# Patient Record
Sex: FEMALE | Race: WHITE | NOT HISPANIC OR LATINO | Employment: PART TIME | URBAN - METROPOLITAN AREA
[De-identification: names, ages, dates, MRNs, and addresses within clinical notes are randomized per-mention and may not be internally consistent; named-entity substitution may affect disease eponyms.]

---

## 2019-07-01 ENCOUNTER — HOSPITAL ENCOUNTER (OUTPATIENT)
Dept: GENERAL RADIOLOGY | Facility: HOSPITAL | Age: 64
Discharge: HOME | End: 2019-07-01
Attending: PHYSICIAN ASSISTANT

## 2019-07-08 ENCOUNTER — HOSPITAL ENCOUNTER (OUTPATIENT)
Dept: LAB | Facility: HOSPITAL | Age: 64
Discharge: HOME | End: 2019-07-08
Attending: PHYSICIAN ASSISTANT

## 2019-07-08 LAB
25(OH)D3 SERPL-MCNC: 19.3 NG/ML (ref 30–100)
ALBUMIN SERPL-MCNC: 4.3 G/DL (ref 3.5–5)
ALBUMIN/GLOB SERPL: 1.5 {RATIO} (ref 1.4–2.6)
ALP SERPL-CCNC: 102 U/L (ref 43–160)
ALT SERPL-CCNC: 24 U/L (ref 10–40)
ANION GAP SERPL CALC-SCNC: 20 MMOL/L (ref 8–19)
AST SERPL-CCNC: 21 U/L (ref 15–50)
BASOPHILS # BLD AUTO: 0.04 10*3/UL (ref 0–0.2)
BASOPHILS NFR BLD AUTO: 0.7 % (ref 0–3)
BILIRUB SERPL-MCNC: 0.27 MG/DL (ref 0.2–1.3)
BUN SERPL-MCNC: 15 MG/DL (ref 5–25)
BUN/CREAT SERPL: 15 {RATIO} (ref 6–20)
CALCIUM SERPL-MCNC: 9.4 MG/DL (ref 8.7–10.4)
CHLORIDE SERPL-SCNC: 104 MMOL/L (ref 99–111)
CHOLEST SERPL-MCNC: 246 MG/DL (ref 107–200)
CHOLEST/HDLC SERPL: 5.7 {RATIO} (ref 3–6)
CONV ABS IMM GRAN: 0.02 10*3/UL (ref 0–0.2)
CONV CO2: 23 MMOL/L (ref 22–32)
CONV IMMATURE GRAN: 0.4 % (ref 0–1.8)
CONV TOTAL PROTEIN: 7.2 G/DL (ref 6.3–8.2)
CREAT UR-MCNC: 0.99 MG/DL (ref 0.5–0.9)
DEPRECATED RDW RBC AUTO: 49.5 FL (ref 36.4–46.3)
EOSINOPHIL # BLD AUTO: 0.27 10*3/UL (ref 0–0.7)
EOSINOPHIL # BLD AUTO: 4.8 % (ref 0–7)
ERYTHROCYTE [DISTWIDTH] IN BLOOD BY AUTOMATED COUNT: 13.7 % (ref 11.7–14.4)
GFR SERPLBLD BASED ON 1.73 SQ M-ARVRAT: 60 ML/MIN/{1.73_M2}
GLOBULIN UR ELPH-MCNC: 2.9 G/DL (ref 2–3.5)
GLUCOSE SERPL-MCNC: 98 MG/DL (ref 65–99)
HBA1C MFR BLD: 12.7 G/DL (ref 12–16)
HCT VFR BLD AUTO: 38.3 % (ref 37–47)
HDLC SERPL-MCNC: 43 MG/DL (ref 40–60)
LDLC SERPL CALC-MCNC: 169 MG/DL (ref 70–100)
LYMPHOCYTES # BLD AUTO: 2.49 10*3/UL (ref 1–5)
MCH RBC QN AUTO: 32.4 PG (ref 27–31)
MCHC RBC AUTO-ENTMCNC: 33.2 G/DL (ref 33–37)
MCV RBC AUTO: 97.7 FL (ref 81–99)
MONOCYTES # BLD AUTO: 0.39 10*3/UL (ref 0.2–1.2)
MONOCYTES NFR BLD AUTO: 7 % (ref 3–10)
NEUTROPHILS # BLD AUTO: 2.38 10*3/UL (ref 2–8)
NEUTROPHILS NFR BLD AUTO: 42.6 % (ref 30–85)
NRBC CBCN: 0 % (ref 0–0.7)
OSMOLALITY SERPL CALC.SUM OF ELEC: 297 MOSM/KG (ref 273–304)
PLATELET # BLD AUTO: 307 10*3/UL (ref 130–400)
PMV BLD AUTO: 10.9 FL (ref 9.4–12.3)
POTASSIUM SERPL-SCNC: 4.1 MMOL/L (ref 3.5–5.3)
RBC # BLD AUTO: 3.92 10*6/UL (ref 4.2–5.4)
SODIUM SERPL-SCNC: 143 MMOL/L (ref 135–147)
T4 FREE SERPL-MCNC: 0.9 NG/DL (ref 0.9–1.8)
TRIGL SERPL-MCNC: 419 MG/DL (ref 40–150)
TSH SERPL-ACNC: 3.24 M[IU]/L (ref 0.27–4.2)
VARIANT LYMPHS NFR BLD MANUAL: 44.5 % (ref 20–45)
WBC # BLD AUTO: 5.59 10*3/UL (ref 4.8–10.8)

## 2019-07-23 ENCOUNTER — OFFICE VISIT CONVERTED (OUTPATIENT)
Dept: SURGERY | Facility: CLINIC | Age: 64
End: 2019-07-23
Attending: SURGERY

## 2019-07-23 ENCOUNTER — CONVERSION ENCOUNTER (OUTPATIENT)
Dept: SURGERY | Facility: CLINIC | Age: 64
End: 2019-07-23

## 2019-08-09 ENCOUNTER — HOSPITAL ENCOUNTER (OUTPATIENT)
Dept: GASTROENTEROLOGY | Facility: HOSPITAL | Age: 64
Setting detail: HOSPITAL OUTPATIENT SURGERY
Discharge: HOME | End: 2019-08-09
Attending: SURGERY

## 2019-10-18 ENCOUNTER — HOSPITAL ENCOUNTER (OUTPATIENT)
Dept: LAB | Facility: HOSPITAL | Age: 64
Discharge: HOME | End: 2019-10-18
Attending: PHYSICIAN ASSISTANT

## 2019-10-18 LAB
ALBUMIN SERPL-MCNC: 4.7 G/DL (ref 3.5–5)
ALBUMIN/GLOB SERPL: 1.6 {RATIO} (ref 1.4–2.6)
ALP SERPL-CCNC: 100 U/L (ref 43–160)
ALT SERPL-CCNC: 35 U/L (ref 10–40)
ANION GAP SERPL CALC-SCNC: 20 MMOL/L (ref 8–19)
AST SERPL-CCNC: 25 U/L (ref 15–50)
BILIRUB SERPL-MCNC: 0.37 MG/DL (ref 0.2–1.3)
BUN SERPL-MCNC: 16 MG/DL (ref 5–25)
BUN/CREAT SERPL: 15 {RATIO} (ref 6–20)
CALCIUM SERPL-MCNC: 9.9 MG/DL (ref 8.7–10.4)
CHLORIDE SERPL-SCNC: 100 MMOL/L (ref 99–111)
CHOLEST SERPL-MCNC: 177 MG/DL (ref 107–200)
CHOLEST/HDLC SERPL: 3.8 {RATIO} (ref 3–6)
CONV CO2: 23 MMOL/L (ref 22–32)
CONV TOTAL PROTEIN: 7.6 G/DL (ref 6.3–8.2)
CREAT UR-MCNC: 1.07 MG/DL (ref 0.5–0.9)
GFR SERPLBLD BASED ON 1.73 SQ M-ARVRAT: 54 ML/MIN/{1.73_M2}
GLOBULIN UR ELPH-MCNC: 2.9 G/DL (ref 2–3.5)
GLUCOSE SERPL-MCNC: 97 MG/DL (ref 65–99)
HDLC SERPL-MCNC: 47 MG/DL (ref 40–60)
LDLC SERPL CALC-MCNC: 92 MG/DL (ref 70–100)
OSMOLALITY SERPL CALC.SUM OF ELEC: 289 MOSM/KG (ref 273–304)
POTASSIUM SERPL-SCNC: 4.1 MMOL/L (ref 3.5–5.3)
SODIUM SERPL-SCNC: 139 MMOL/L (ref 135–147)
TRIGL SERPL-MCNC: 190 MG/DL (ref 40–150)
VLDLC SERPL-MCNC: 38 MG/DL (ref 5–37)

## 2019-10-24 ENCOUNTER — OFFICE VISIT CONVERTED (OUTPATIENT)
Dept: SURGERY | Facility: CLINIC | Age: 64
End: 2019-10-24
Attending: SURGERY

## 2020-02-10 ENCOUNTER — HOSPITAL ENCOUNTER (OUTPATIENT)
Dept: LAB | Facility: HOSPITAL | Age: 65
Discharge: HOME | End: 2020-02-10
Attending: PHYSICIAN ASSISTANT

## 2020-02-10 LAB
25(OH)D3 SERPL-MCNC: 19.3 NG/ML (ref 30–100)
ALBUMIN SERPL-MCNC: 4.5 G/DL (ref 3.5–5)
ALBUMIN/GLOB SERPL: 1.5 {RATIO} (ref 1.4–2.6)
ALP SERPL-CCNC: 106 U/L (ref 43–160)
ALT SERPL-CCNC: 31 U/L (ref 10–40)
ANION GAP SERPL CALC-SCNC: 18 MMOL/L (ref 8–19)
AST SERPL-CCNC: 25 U/L (ref 15–50)
BASOPHILS # BLD AUTO: 0.03 10*3/UL (ref 0–0.2)
BASOPHILS NFR BLD AUTO: 0.6 % (ref 0–3)
BILIRUB SERPL-MCNC: 0.35 MG/DL (ref 0.2–1.3)
BUN SERPL-MCNC: 13 MG/DL (ref 5–25)
BUN/CREAT SERPL: 13 {RATIO} (ref 6–20)
CALCIUM SERPL-MCNC: 9.7 MG/DL (ref 8.7–10.4)
CHLORIDE SERPL-SCNC: 101 MMOL/L (ref 99–111)
CHOLEST SERPL-MCNC: 174 MG/DL (ref 107–200)
CHOLEST/HDLC SERPL: 4 {RATIO} (ref 3–6)
CONV ABS IMM GRAN: 0.01 10*3/UL (ref 0–0.2)
CONV CO2: 25 MMOL/L (ref 22–32)
CONV IMMATURE GRAN: 0.2 % (ref 0–1.8)
CONV TOTAL PROTEIN: 7.5 G/DL (ref 6.3–8.2)
CREAT UR-MCNC: 1.01 MG/DL (ref 0.5–0.9)
DEPRECATED RDW RBC AUTO: 47.9 FL (ref 36.4–46.3)
EOSINOPHIL # BLD AUTO: 0.16 10*3/UL (ref 0–0.7)
EOSINOPHIL # BLD AUTO: 3.2 % (ref 0–7)
ERYTHROCYTE [DISTWIDTH] IN BLOOD BY AUTOMATED COUNT: 13.5 % (ref 11.7–14.4)
GFR SERPLBLD BASED ON 1.73 SQ M-ARVRAT: 58 ML/MIN/{1.73_M2}
GLOBULIN UR ELPH-MCNC: 3 G/DL (ref 2–3.5)
GLUCOSE SERPL-MCNC: 96 MG/DL (ref 65–99)
HCT VFR BLD AUTO: 41.3 % (ref 37–47)
HDLC SERPL-MCNC: 44 MG/DL (ref 40–60)
HGB BLD-MCNC: 13.6 G/DL (ref 12–16)
LDLC SERPL CALC-MCNC: 90 MG/DL (ref 70–100)
LYMPHOCYTES # BLD AUTO: 2.2 10*3/UL (ref 1–5)
LYMPHOCYTES NFR BLD AUTO: 43.6 % (ref 20–45)
MCH RBC QN AUTO: 31.9 PG (ref 27–31)
MCHC RBC AUTO-ENTMCNC: 32.9 G/DL (ref 33–37)
MCV RBC AUTO: 96.7 FL (ref 81–99)
MONOCYTES # BLD AUTO: 0.33 10*3/UL (ref 0.2–1.2)
MONOCYTES NFR BLD AUTO: 6.5 % (ref 3–10)
NEUTROPHILS # BLD AUTO: 2.32 10*3/UL (ref 2–8)
NEUTROPHILS NFR BLD AUTO: 45.9 % (ref 30–85)
NRBC CBCN: 0 % (ref 0–0.7)
OSMOLALITY SERPL CALC.SUM OF ELEC: 290 MOSM/KG (ref 273–304)
PLATELET # BLD AUTO: 301 10*3/UL (ref 130–400)
PMV BLD AUTO: 11 FL (ref 9.4–12.3)
POTASSIUM SERPL-SCNC: 4.3 MMOL/L (ref 3.5–5.3)
RBC # BLD AUTO: 4.27 10*6/UL (ref 4.2–5.4)
SODIUM SERPL-SCNC: 140 MMOL/L (ref 135–147)
T4 FREE SERPL-MCNC: 1 NG/DL (ref 0.9–1.8)
TRIGL SERPL-MCNC: 198 MG/DL (ref 40–150)
TSH SERPL-ACNC: 1.62 M[IU]/L (ref 0.27–4.2)
VLDLC SERPL-MCNC: 40 MG/DL (ref 5–37)
WBC # BLD AUTO: 5.05 10*3/UL (ref 4.8–10.8)

## 2020-08-17 ENCOUNTER — HOSPITAL ENCOUNTER (OUTPATIENT)
Dept: LAB | Facility: HOSPITAL | Age: 65
Discharge: HOME | End: 2020-08-17
Attending: PHYSICIAN ASSISTANT

## 2020-08-17 LAB
25(OH)D3 SERPL-MCNC: 38.5 NG/ML (ref 30–100)
ALBUMIN SERPL-MCNC: 4.6 G/DL (ref 3.5–5)
ALBUMIN/GLOB SERPL: 1.5 {RATIO} (ref 1.4–2.6)
ALP SERPL-CCNC: 109 U/L (ref 43–160)
ALT SERPL-CCNC: 51 U/L (ref 10–40)
ANION GAP SERPL CALC-SCNC: 30 MMOL/L (ref 8–19)
AST SERPL-CCNC: 52 U/L (ref 15–50)
BASOPHILS # BLD AUTO: 0.03 10*3/UL (ref 0–0.2)
BASOPHILS NFR BLD AUTO: 0.7 % (ref 0–3)
BILIRUB SERPL-MCNC: 0.44 MG/DL (ref 0.2–1.3)
BUN SERPL-MCNC: 17 MG/DL (ref 5–25)
BUN/CREAT SERPL: 15 {RATIO} (ref 6–20)
CALCIUM SERPL-MCNC: 10.6 MG/DL (ref 8.7–10.4)
CHLORIDE SERPL-SCNC: 101 MMOL/L (ref 99–111)
CHOLEST SERPL-MCNC: 190 MG/DL (ref 107–200)
CHOLEST/HDLC SERPL: 3.8 {RATIO} (ref 3–6)
CONV ABS IMM GRAN: 0.01 10*3/UL (ref 0–0.2)
CONV CO2: 18 MMOL/L (ref 22–32)
CONV IMMATURE GRAN: 0.2 % (ref 0–1.8)
CONV TOTAL PROTEIN: 7.6 G/DL (ref 6.3–8.2)
CREAT UR-MCNC: 1.16 MG/DL (ref 0.5–0.9)
DEPRECATED RDW RBC AUTO: 52.5 FL (ref 36.4–46.3)
EOSINOPHIL # BLD AUTO: 0.18 10*3/UL (ref 0–0.7)
EOSINOPHIL # BLD AUTO: 4.1 % (ref 0–7)
ERYTHROCYTE [DISTWIDTH] IN BLOOD BY AUTOMATED COUNT: 14.3 % (ref 11.7–14.4)
GFR SERPLBLD BASED ON 1.73 SQ M-ARVRAT: 49 ML/MIN/{1.73_M2}
GLOBULIN UR ELPH-MCNC: 3 G/DL (ref 2–3.5)
GLUCOSE SERPL-MCNC: 102 MG/DL (ref 65–99)
HCT VFR BLD AUTO: 41.2 % (ref 37–47)
HDLC SERPL-MCNC: 50 MG/DL (ref 40–60)
HGB BLD-MCNC: 13.4 G/DL (ref 12–16)
LDLC SERPL CALC-MCNC: 90 MG/DL (ref 70–100)
LYMPHOCYTES # BLD AUTO: 1.96 10*3/UL (ref 1–5)
LYMPHOCYTES NFR BLD AUTO: 44.5 % (ref 20–45)
MCH RBC QN AUTO: 32.3 PG (ref 27–31)
MCHC RBC AUTO-ENTMCNC: 32.5 G/DL (ref 33–37)
MCV RBC AUTO: 99.3 FL (ref 81–99)
MONOCYTES # BLD AUTO: 0.33 10*3/UL (ref 0.2–1.2)
MONOCYTES NFR BLD AUTO: 7.5 % (ref 3–10)
NEUTROPHILS # BLD AUTO: 1.89 10*3/UL (ref 2–8)
NEUTROPHILS NFR BLD AUTO: 43 % (ref 30–85)
NRBC CBCN: 0 % (ref 0–0.7)
OSMOLALITY SERPL CALC.SUM OF ELEC: 300 MOSM/KG (ref 273–304)
PLATELET # BLD AUTO: 271 10*3/UL (ref 130–400)
PMV BLD AUTO: 10.4 FL (ref 9.4–12.3)
POTASSIUM SERPL-SCNC: 4.6 MMOL/L (ref 3.5–5.3)
RBC # BLD AUTO: 4.15 10*6/UL (ref 4.2–5.4)
SODIUM SERPL-SCNC: 144 MMOL/L (ref 135–147)
T4 FREE SERPL-MCNC: 1.2 NG/DL (ref 0.9–1.8)
TRIGL SERPL-MCNC: 248 MG/DL (ref 40–150)
TSH SERPL-ACNC: 2.5 M[IU]/L (ref 0.27–4.2)
VLDLC SERPL-MCNC: 50 MG/DL (ref 5–37)
WBC # BLD AUTO: 4.4 10*3/UL (ref 4.8–10.8)

## 2020-09-01 ENCOUNTER — HOSPITAL ENCOUNTER (OUTPATIENT)
Dept: GENERAL RADIOLOGY | Facility: HOSPITAL | Age: 65
Discharge: HOME | End: 2020-09-01
Attending: PHYSICIAN ASSISTANT

## 2020-09-09 ENCOUNTER — HOSPITAL ENCOUNTER (OUTPATIENT)
Dept: LAB | Facility: HOSPITAL | Age: 65
Discharge: HOME | End: 2020-09-09
Attending: PHYSICIAN ASSISTANT

## 2020-09-09 LAB
ALBUMIN SERPL-MCNC: 4.5 G/DL (ref 3.5–5)
ALBUMIN/GLOB SERPL: 1.5 {RATIO} (ref 1.4–2.6)
ALP SERPL-CCNC: 96 U/L (ref 43–160)
ALT SERPL-CCNC: 32 U/L (ref 10–40)
ANION GAP SERPL CALC-SCNC: 17 MMOL/L (ref 8–19)
AST SERPL-CCNC: 24 U/L (ref 15–50)
BASOPHILS # BLD AUTO: 0.04 10*3/UL (ref 0–0.2)
BASOPHILS NFR BLD AUTO: 0.7 % (ref 0–3)
BILIRUB SERPL-MCNC: 0.39 MG/DL (ref 0.2–1.3)
BUN SERPL-MCNC: 15 MG/DL (ref 5–25)
BUN/CREAT SERPL: 13 {RATIO} (ref 6–20)
CALCIUM SERPL-MCNC: 9.6 MG/DL (ref 8.7–10.4)
CHLORIDE SERPL-SCNC: 100 MMOL/L (ref 99–111)
CONV ABS IMM GRAN: 0.02 10*3/UL (ref 0–0.2)
CONV CO2: 25 MMOL/L (ref 22–32)
CONV IMMATURE GRAN: 0.3 % (ref 0–1.8)
CONV TOTAL PROTEIN: 7.6 G/DL (ref 6.3–8.2)
CREAT UR-MCNC: 1.17 MG/DL (ref 0.5–0.9)
DEPRECATED RDW RBC AUTO: 49.6 FL (ref 36.4–46.3)
EOSINOPHIL # BLD AUTO: 0.2 10*3/UL (ref 0–0.7)
EOSINOPHIL # BLD AUTO: 3.3 % (ref 0–7)
ERYTHROCYTE [DISTWIDTH] IN BLOOD BY AUTOMATED COUNT: 13.6 % (ref 11.7–14.4)
GFR SERPLBLD BASED ON 1.73 SQ M-ARVRAT: 48 ML/MIN/{1.73_M2}
GLOBULIN UR ELPH-MCNC: 3.1 G/DL (ref 2–3.5)
GLUCOSE SERPL-MCNC: 102 MG/DL (ref 65–99)
HCT VFR BLD AUTO: 43 % (ref 37–47)
HGB BLD-MCNC: 13.8 G/DL (ref 12–16)
LYMPHOCYTES # BLD AUTO: 2.55 10*3/UL (ref 1–5)
LYMPHOCYTES NFR BLD AUTO: 42.3 % (ref 20–45)
MCH RBC QN AUTO: 31.5 PG (ref 27–31)
MCHC RBC AUTO-ENTMCNC: 32.1 G/DL (ref 33–37)
MCV RBC AUTO: 98.2 FL (ref 81–99)
MONOCYTES # BLD AUTO: 0.36 10*3/UL (ref 0.2–1.2)
MONOCYTES NFR BLD AUTO: 6 % (ref 3–10)
NEUTROPHILS # BLD AUTO: 2.86 10*3/UL (ref 2–8)
NEUTROPHILS NFR BLD AUTO: 47.4 % (ref 30–85)
NRBC CBCN: 0 % (ref 0–0.7)
OSMOLALITY SERPL CALC.SUM OF ELEC: 285 MOSM/KG (ref 273–304)
PLATELET # BLD AUTO: 355 10*3/UL (ref 130–400)
PMV BLD AUTO: 10.5 FL (ref 9.4–12.3)
POTASSIUM SERPL-SCNC: 4.5 MMOL/L (ref 3.5–5.3)
RBC # BLD AUTO: 4.38 10*6/UL (ref 4.2–5.4)
SODIUM SERPL-SCNC: 137 MMOL/L (ref 135–147)
WBC # BLD AUTO: 6.03 10*3/UL (ref 4.8–10.8)

## 2020-09-15 ENCOUNTER — HOSPITAL ENCOUNTER (OUTPATIENT)
Dept: GENERAL RADIOLOGY | Facility: HOSPITAL | Age: 65
Discharge: HOME | End: 2020-09-15
Attending: PHYSICIAN ASSISTANT

## 2020-10-26 ENCOUNTER — HOSPITAL ENCOUNTER (OUTPATIENT)
Dept: LAB | Facility: HOSPITAL | Age: 65
Discharge: HOME | End: 2020-10-26
Attending: PHYSICIAN ASSISTANT

## 2020-10-26 LAB
ANION GAP SERPL CALC-SCNC: 20 MMOL/L (ref 8–19)
BUN SERPL-MCNC: 19 MG/DL (ref 5–25)
BUN/CREAT SERPL: 18 {RATIO} (ref 6–20)
CALCIUM SERPL-MCNC: 9.5 MG/DL (ref 8.7–10.4)
CHLORIDE SERPL-SCNC: 100 MMOL/L (ref 99–111)
CONV CO2: 22 MMOL/L (ref 22–32)
CREAT UR-MCNC: 1.04 MG/DL (ref 0.5–0.9)
GFR SERPLBLD BASED ON 1.73 SQ M-ARVRAT: 56 ML/MIN/{1.73_M2}
GLUCOSE SERPL-MCNC: 80 MG/DL (ref 65–99)
OSMOLALITY SERPL CALC.SUM OF ELEC: 287 MOSM/KG (ref 273–304)
POTASSIUM SERPL-SCNC: 4.3 MMOL/L (ref 3.5–5.3)
SODIUM SERPL-SCNC: 138 MMOL/L (ref 135–147)

## 2020-10-30 ENCOUNTER — HOSPITAL ENCOUNTER (OUTPATIENT)
Dept: GENERAL RADIOLOGY | Facility: HOSPITAL | Age: 65
Discharge: HOME | End: 2020-10-30
Attending: PHYSICIAN ASSISTANT

## 2021-03-25 ENCOUNTER — HOSPITAL ENCOUNTER (OUTPATIENT)
Dept: LAB | Facility: HOSPITAL | Age: 66
Discharge: HOME | End: 2021-03-25
Attending: PHYSICIAN ASSISTANT

## 2021-03-25 LAB
25(OH)D3 SERPL-MCNC: 33.2 NG/ML (ref 30–100)
ALBUMIN SERPL-MCNC: 4 G/DL (ref 3.5–5)
ALBUMIN/GLOB SERPL: 1.3 {RATIO} (ref 1.4–2.6)
ALP SERPL-CCNC: 99 U/L (ref 43–160)
ALT SERPL-CCNC: 40 U/L (ref 10–40)
ANION GAP SERPL CALC-SCNC: 16 MMOL/L (ref 8–19)
AST SERPL-CCNC: 30 U/L (ref 15–50)
BASOPHILS # BLD AUTO: 0.03 10*3/UL (ref 0–0.2)
BASOPHILS NFR BLD AUTO: 0.5 % (ref 0–3)
BILIRUB SERPL-MCNC: 0.25 MG/DL (ref 0.2–1.3)
BUN SERPL-MCNC: 15 MG/DL (ref 5–25)
BUN/CREAT SERPL: 15 {RATIO} (ref 6–20)
CALCIUM SERPL-MCNC: 9.5 MG/DL (ref 8.7–10.4)
CHLORIDE SERPL-SCNC: 101 MMOL/L (ref 99–111)
CHOLEST SERPL-MCNC: 167 MG/DL (ref 107–200)
CHOLEST/HDLC SERPL: 3 {RATIO} (ref 3–6)
CONV ABS IMM GRAN: 0.01 10*3/UL (ref 0–0.2)
CONV CO2: 25 MMOL/L (ref 22–32)
CONV IMMATURE GRAN: 0.2 % (ref 0–1.8)
CONV TOTAL PROTEIN: 7 G/DL (ref 6.3–8.2)
CREAT UR-MCNC: 1.03 MG/DL (ref 0.5–0.9)
DEPRECATED RDW RBC AUTO: 51.9 FL (ref 36.4–46.3)
EOSINOPHIL # BLD AUTO: 0.2 10*3/UL (ref 0–0.7)
EOSINOPHIL # BLD AUTO: 3.6 % (ref 0–7)
ERYTHROCYTE [DISTWIDTH] IN BLOOD BY AUTOMATED COUNT: 13.9 % (ref 11.7–14.4)
GFR SERPLBLD BASED ON 1.73 SQ M-ARVRAT: 56 ML/MIN/{1.73_M2}
GLOBULIN UR ELPH-MCNC: 3 G/DL (ref 2–3.5)
GLUCOSE SERPL-MCNC: 108 MG/DL (ref 65–99)
HCT VFR BLD AUTO: 37.3 % (ref 37–47)
HDLC SERPL-MCNC: 55 MG/DL (ref 40–60)
HGB BLD-MCNC: 12.4 G/DL (ref 12–16)
LDLC SERPL CALC-MCNC: 69 MG/DL (ref 70–100)
LYMPHOCYTES # BLD AUTO: 2.17 10*3/UL (ref 1–5)
LYMPHOCYTES NFR BLD AUTO: 38.5 % (ref 20–45)
MCH RBC QN AUTO: 33.7 PG (ref 27–31)
MCHC RBC AUTO-ENTMCNC: 33.2 G/DL (ref 33–37)
MCV RBC AUTO: 101.4 FL (ref 81–99)
MONOCYTES # BLD AUTO: 0.43 10*3/UL (ref 0.2–1.2)
MONOCYTES NFR BLD AUTO: 7.6 % (ref 3–10)
NEUTROPHILS # BLD AUTO: 2.79 10*3/UL (ref 2–8)
NEUTROPHILS NFR BLD AUTO: 49.6 % (ref 30–85)
NRBC CBCN: 0 % (ref 0–0.7)
OSMOLALITY SERPL CALC.SUM OF ELEC: 285 MOSM/KG (ref 273–304)
PLATELET # BLD AUTO: 266 10*3/UL (ref 130–400)
PMV BLD AUTO: 10.2 FL (ref 9.4–12.3)
POTASSIUM SERPL-SCNC: 4.7 MMOL/L (ref 3.5–5.3)
RBC # BLD AUTO: 3.68 10*6/UL (ref 4.2–5.4)
SODIUM SERPL-SCNC: 137 MMOL/L (ref 135–147)
T4 FREE SERPL-MCNC: 1 NG/DL (ref 0.9–1.8)
TRIGL SERPL-MCNC: 216 MG/DL (ref 40–150)
TSH SERPL-ACNC: 2.27 M[IU]/L (ref 0.27–4.2)
VLDLC SERPL-MCNC: 43 MG/DL (ref 5–37)
WBC # BLD AUTO: 5.63 10*3/UL (ref 4.8–10.8)

## 2021-05-15 VITALS — BODY MASS INDEX: 36.81 KG/M2 | WEIGHT: 234.5 LBS | RESPIRATION RATE: 18 BRPM | HEIGHT: 67 IN

## 2021-05-15 VITALS — WEIGHT: 240.12 LBS | HEIGHT: 67 IN | RESPIRATION RATE: 12 BRPM | BODY MASS INDEX: 37.69 KG/M2

## 2021-05-22 ENCOUNTER — TRANSCRIBE ORDERS (OUTPATIENT)
Dept: ADMINISTRATIVE | Facility: HOSPITAL | Age: 66
End: 2021-05-22

## 2021-05-22 DIAGNOSIS — Z12.31 VISIT FOR SCREENING MAMMOGRAM: Primary | ICD-10-CM

## 2021-07-13 DIAGNOSIS — R52 PAIN: Primary | ICD-10-CM

## 2021-07-13 RX ORDER — GABAPENTIN 100 MG/1
100 CAPSULE ORAL 3 TIMES DAILY
COMMUNITY
End: 2021-07-13 | Stop reason: SDUPTHER

## 2021-07-14 RX ORDER — GABAPENTIN 100 MG/1
100 CAPSULE ORAL 3 TIMES DAILY
Qty: 90 CAPSULE | Refills: 0 | Status: SHIPPED | OUTPATIENT
Start: 2021-07-14 | End: 2022-03-22 | Stop reason: SDUPTHER

## 2021-09-20 ENCOUNTER — HOSPITAL ENCOUNTER (OUTPATIENT)
Dept: MAMMOGRAPHY | Facility: HOSPITAL | Age: 66
Discharge: HOME | End: 2021-09-20
Admitting: PHYSICIAN ASSISTANT

## 2021-09-20 DIAGNOSIS — Z12.31 VISIT FOR SCREENING MAMMOGRAM: ICD-10-CM

## 2021-09-20 PROCEDURE — 77067 SCR MAMMO BI INCL CAD: CPT

## 2021-09-20 PROCEDURE — 77063 BREAST TOMOSYNTHESIS BI: CPT

## 2021-09-24 ENCOUNTER — OFFICE VISIT (OUTPATIENT)
Dept: INTERNAL MEDICINE | Facility: CLINIC | Age: 66
End: 2021-09-24

## 2021-09-24 VITALS
WEIGHT: 237.8 LBS | DIASTOLIC BLOOD PRESSURE: 84 MMHG | HEART RATE: 57 BPM | HEIGHT: 67 IN | OXYGEN SATURATION: 97 % | BODY MASS INDEX: 37.32 KG/M2 | SYSTOLIC BLOOD PRESSURE: 144 MMHG | TEMPERATURE: 97.5 F

## 2021-09-24 DIAGNOSIS — I10 ESSENTIAL HYPERTENSION: Primary | Chronic | ICD-10-CM

## 2021-09-24 DIAGNOSIS — K21.9 GASTRO-ESOPHAGEAL REFLUX DISEASE WITHOUT ESOPHAGITIS: ICD-10-CM

## 2021-09-24 DIAGNOSIS — R73.01 IMPAIRED FASTING GLUCOSE: ICD-10-CM

## 2021-09-24 DIAGNOSIS — E55.9 VITAMIN D DEFICIENCY: ICD-10-CM

## 2021-09-24 DIAGNOSIS — F33.0 MILD RECURRENT MAJOR DEPRESSION (HCC): Chronic | ICD-10-CM

## 2021-09-24 DIAGNOSIS — N18.31 STAGE 3A CHRONIC KIDNEY DISEASE (HCC): Chronic | ICD-10-CM

## 2021-09-24 DIAGNOSIS — E78.5 HYPERLIPIDEMIA, UNSPECIFIED HYPERLIPIDEMIA TYPE: Chronic | ICD-10-CM

## 2021-09-24 DIAGNOSIS — M51.36 DEGENERATIVE DISC DISEASE, LUMBAR: Chronic | ICD-10-CM

## 2021-09-24 PROBLEM — N18.30 STAGE 3 CHRONIC KIDNEY DISEASE: Status: ACTIVE | Noted: 2021-09-24

## 2021-09-24 PROBLEM — D72.9 WHITE BLOOD CELL DISORDER: Status: ACTIVE | Noted: 2021-09-24

## 2021-09-24 PROBLEM — M51.369 DEGENERATIVE DISC DISEASE, LUMBAR: Status: ACTIVE | Noted: 2021-09-24

## 2021-09-24 PROCEDURE — 99214 OFFICE O/P EST MOD 30 MIN: CPT | Performed by: NURSE PRACTITIONER

## 2021-09-24 RX ORDER — LISINOPRIL 10 MG/1
10 TABLET ORAL DAILY
Qty: 90 TABLET | Refills: 1 | Status: SHIPPED | OUTPATIENT
Start: 2021-09-24 | End: 2022-03-22 | Stop reason: SDUPTHER

## 2021-09-24 RX ORDER — ATORVASTATIN CALCIUM 10 MG/1
TABLET, FILM COATED ORAL
COMMUNITY
Start: 2021-09-13 | End: 2021-09-24 | Stop reason: SDUPTHER

## 2021-09-24 RX ORDER — ESOMEPRAZOLE MAGNESIUM 40 MG/1
CAPSULE, DELAYED RELEASE ORAL
COMMUNITY
Start: 2021-09-13 | End: 2021-09-24 | Stop reason: SDUPTHER

## 2021-09-24 RX ORDER — ESOMEPRAZOLE MAGNESIUM 40 MG/1
40 CAPSULE, DELAYED RELEASE ORAL
Qty: 90 CAPSULE | Refills: 1 | Status: SHIPPED | OUTPATIENT
Start: 2021-09-24 | End: 2022-03-22 | Stop reason: SDUPTHER

## 2021-09-24 RX ORDER — FLUOXETINE HYDROCHLORIDE 20 MG/1
CAPSULE ORAL
COMMUNITY
End: 2021-09-24 | Stop reason: SDUPTHER

## 2021-09-24 RX ORDER — ERGOCALCIFEROL 1.25 MG/1
CAPSULE ORAL
COMMUNITY
End: 2021-09-24

## 2021-09-24 RX ORDER — FLUOXETINE HYDROCHLORIDE 20 MG/1
20 CAPSULE ORAL DAILY
Qty: 90 CAPSULE | Refills: 1 | Status: SHIPPED | OUTPATIENT
Start: 2021-09-24 | End: 2022-03-22

## 2021-09-24 RX ORDER — LISINOPRIL 10 MG/1
TABLET ORAL
COMMUNITY
Start: 2021-09-13 | End: 2021-09-24 | Stop reason: SDUPTHER

## 2021-09-24 RX ORDER — ATORVASTATIN CALCIUM 10 MG/1
10 TABLET, FILM COATED ORAL NIGHTLY
Qty: 90 TABLET | Refills: 1 | Status: SHIPPED | OUTPATIENT
Start: 2021-09-24 | End: 2022-03-22 | Stop reason: SDUPTHER

## 2021-09-24 RX ORDER — MELATONIN
2000 DAILY
COMMUNITY

## 2021-09-24 NOTE — PROGRESS NOTES
"Chief Complaint  Follow-up (follow up/ no labs done. )    Subjective    History of Present Illness:  Zahira Perea presents to [facility]  for follow-up chronic disease management.  The patient is not having any medication side effects. No recent labs done.  Denies chest pain, shortness of air, abdominal pain, or change in bowel habits.  Has been following with Dr. Guy who is a back surgeon for degenerative disc disease.  Uses gabapentin for leg cramps and low back pain.  She recently had a nerve ablation and the back pain has improved and she is off of the gabapentin currently.  She has had 2 doses of the Covid vaccine.    Moderate degenerative disc disease on x-ray and on Neurontin and cannot take NSAIDs due to chronic kidney disease stage III.  Ultrasound kidneys normal 2020.  Status post hysterectomy.  Colonoscopy 8/2019 per Dr. Mcduffie and polyps removed.  Bone mineral density 2019 normal.  Prevnar 13 11/2017 and 2 doses of Covid vaccine 2021.  Mammogram 9/16/2020.    Objective   Vital Signs:   /84 (BP Location: Left arm, Patient Position: Sitting, Cuff Size: Adult)   Pulse 57   Temp 97.5 °F (36.4 °C) (Temporal)   Ht 170.2 cm (67\")   Wt 108 kg (237 lb 12.8 oz)   SpO2 97%   BMI 37.24 kg/m²       Physical Exam :  General: Well nourished, well hydrated, in no acute distress  HEENT: Conjunctiva clear, no exudate bilateral  Neck: Supple, non-tender, no adenopathy  Skin: Warm and dry  Cardiovascular: S1 S2, regular rate and rhythm, no murmur  Respiratory: Clear to auscultation bilateral, no wheezes, rhonchi, or rales  Chest: Normal shape, no deformity, normal work of breathing  Extremities: No lower extremity edema  Neurological: Alert, conversing normally  Psychological: Good eye contact, mood good, and judgment intact        Assessment and Plan:  Diagnoses and all orders for this visit:    1. Essential hypertension (Primary)  Comments:  Stable on medication and to " continue.  Orders:  -     Comprehensive Metabolic Panel; Future  -     CBC & Differential; Future  -     T4, Free; Future  -     TSH; Future  -     lisinopril (PRINIVIL,ZESTRIL) 10 MG tablet; Take 1 tablet by mouth Daily.  Dispense: 90 tablet; Refill: 1    2. Hyperlipidemia, unspecified hyperlipidemia type  Comments:  Stable on medication continue.  Orders:  -     Lipid Panel; Future  -     atorvastatin (LIPITOR) 10 MG tablet; Take 1 tablet by mouth Every Night.  Dispense: 90 tablet; Refill: 1    3. Mild recurrent major depression (CMS/HCC)  Comments:  Stable on medication and to continue.  Orders:  -     FLUoxetine (PROzac) 20 MG capsule; Take 1 capsule by mouth Daily.  Dispense: 90 capsule; Refill: 1    4. Gastro-esophageal reflux disease without esophagitis  Comments:  Stable on medication and to continue.  Orders:  -     esomeprazole (nexIUM) 40 MG capsule; Take 1 capsule by mouth Every Morning Before Breakfast.  Dispense: 90 capsule; Refill: 1    5. Degenerative disc disease, lumbar  Comments:  Stable after nerve ablation.  To call if needs to restart Neurontin. PAVEL reviewed and controlled substance contract reviewed and signed.    6. Stage 3a chronic kidney disease (HCC)  Comments:  Aware not to use NSAIDs.  We will continue to monitor.    7. Impaired fasting glucose  Comments:  Will monitor  Orders:  -     Hemoglobin A1c; Future    8. Vitamin D deficiency  Comments:  Taking over-the-counter vitamin D and to continue to monitor.  Orders:  -     Vitamin D 25 Hydroxy; Future        Follow Up:  Patient was given instructions and counseling regarding condition. Return in about 6 months (around 3/24/2022) for Recheck.

## 2021-10-06 ENCOUNTER — TRANSCRIBE ORDERS (OUTPATIENT)
Dept: ADMINISTRATIVE | Facility: HOSPITAL | Age: 66
End: 2021-10-06

## 2021-10-06 DIAGNOSIS — M47.26 OTHER SPONDYLOSIS WITH RADICULOPATHY, LUMBAR REGION: Primary | ICD-10-CM

## 2021-10-14 ENCOUNTER — HOSPITAL ENCOUNTER (OUTPATIENT)
Dept: CT IMAGING | Facility: HOSPITAL | Age: 66
Discharge: HOME | End: 2021-10-14
Admitting: NURSE PRACTITIONER

## 2021-10-14 DIAGNOSIS — M47.26 OTHER SPONDYLOSIS WITH RADICULOPATHY, LUMBAR REGION: ICD-10-CM

## 2021-10-14 PROCEDURE — 72131 CT LUMBAR SPINE W/O DYE: CPT

## 2022-03-22 ENCOUNTER — OFFICE VISIT (OUTPATIENT)
Dept: INTERNAL MEDICINE | Facility: CLINIC | Age: 67
End: 2022-03-22

## 2022-03-22 VITALS
DIASTOLIC BLOOD PRESSURE: 40 MMHG | TEMPERATURE: 97.8 F | SYSTOLIC BLOOD PRESSURE: 100 MMHG | HEART RATE: 86 BPM | WEIGHT: 249.4 LBS | OXYGEN SATURATION: 95 % | BODY MASS INDEX: 39.15 KG/M2 | HEIGHT: 67 IN

## 2022-03-22 DIAGNOSIS — I10 ESSENTIAL HYPERTENSION: Primary | Chronic | ICD-10-CM

## 2022-03-22 DIAGNOSIS — M51.36 DEGENERATIVE DISC DISEASE, LUMBAR: ICD-10-CM

## 2022-03-22 DIAGNOSIS — E78.5 HYPERLIPIDEMIA, UNSPECIFIED HYPERLIPIDEMIA TYPE: Chronic | ICD-10-CM

## 2022-03-22 DIAGNOSIS — K21.9 GASTRO-ESOPHAGEAL REFLUX DISEASE WITHOUT ESOPHAGITIS: ICD-10-CM

## 2022-03-22 DIAGNOSIS — Z11.59 NEED FOR HEPATITIS C SCREENING TEST: ICD-10-CM

## 2022-03-22 DIAGNOSIS — F33.0 MILD RECURRENT MAJOR DEPRESSION: ICD-10-CM

## 2022-03-22 PROCEDURE — 99214 OFFICE O/P EST MOD 30 MIN: CPT | Performed by: NURSE PRACTITIONER

## 2022-03-22 RX ORDER — FLUOXETINE HYDROCHLORIDE 40 MG/1
40 CAPSULE ORAL DAILY
COMMUNITY
Start: 2022-01-13 | End: 2022-03-22 | Stop reason: SDUPTHER

## 2022-03-22 RX ORDER — FLUOXETINE HYDROCHLORIDE 40 MG/1
40 CAPSULE ORAL DAILY
Qty: 90 CAPSULE | Refills: 1 | Status: SHIPPED | OUTPATIENT
Start: 2022-03-22 | End: 2022-10-10

## 2022-03-22 RX ORDER — LISINOPRIL 10 MG/1
10 TABLET ORAL DAILY
Qty: 90 TABLET | Refills: 1 | Status: SHIPPED | OUTPATIENT
Start: 2022-03-22 | End: 2022-10-25

## 2022-03-22 RX ORDER — ATORVASTATIN CALCIUM 10 MG/1
10 TABLET, FILM COATED ORAL NIGHTLY
Qty: 90 TABLET | Refills: 1 | Status: SHIPPED | OUTPATIENT
Start: 2022-03-22 | End: 2022-10-25

## 2022-03-22 RX ORDER — ESOMEPRAZOLE MAGNESIUM 40 MG/1
40 CAPSULE, DELAYED RELEASE ORAL
Qty: 90 CAPSULE | Refills: 1 | Status: SHIPPED | OUTPATIENT
Start: 2022-03-22 | End: 2022-10-28 | Stop reason: SDUPTHER

## 2022-03-22 RX ORDER — GABAPENTIN 100 MG/1
100 CAPSULE ORAL 3 TIMES DAILY
Qty: 90 CAPSULE | Refills: 1 | Status: SHIPPED | OUTPATIENT
Start: 2022-03-22 | End: 2022-05-25 | Stop reason: SDUPTHER

## 2022-03-22 NOTE — PROGRESS NOTES
Answers for HPI/ROS submitted by the patient on 3/20/2022  Please describe your symptoms.: Regular check up for medication refills  Have you had these symptoms before?: Yes  How long have you been having these symptoms?: Greater than 2 weeks  Please list any medications you are currently taking for this condition.: Nexium, Atorvastatin, Lisinopril, Prozac  Please describe any probable cause for these symptoms. : Same as i have been seen before  What is the primary reason for your visit?: Other    Chief Complaint  Follow-up (6 month follow up. Patient is seeing a spine specialist in [place]  Dr. Torres at [facility]. Patients legs and feet have started hurting again, she may think she may need the gabapentin again? She has an appointment April first with pain specialist in [place]. )  Subjective        History of Present Illness  Zahira Perea is a 67 y.o. female who presents to [facility] for follow-up of hypertension, hyperlipidemia, depression, GERD,  CKD, impaired fasting glucose, and vitamin D deficiency.  The patient is not having any medication side effects. Denies dizziness, chest pain, shortness of air, abdominal pain, or change in bowel habits.    Following with Dr. Guy who is a back surgeon for degenerative disc disease.  Uses gabapentin for leg cramps and low back pain. She recently had a nerve ablation and the back pain has improved. She has an appointment with Dr. Torres at [facility].       Moderate degenerative disc disease on x-ray was on Neurontin and cannot take NSAIDs due to chronic kidney disease stage III.  Ultrasound kidneys normal 2020.  Status post hysterectomy.  Colonoscopy 8/2019 per Dr. Mcduffie and polyps removed.  Bone mineral density 2019 normal.   Mammogram 9/16/2020.       Past Medical History:   Diagnosis Date   • Depression    • Disorder of white blood cells    • Essential (primary) hypertension    •  "Gastro-esophageal reflux disease without esophagitis    • Hypercholesterolemia    • Hyperlipidemia, unspecified    • Low blood sugar    • Major depressive disorder, recurrent (HCC)    • Stage 3 chronic kidney disease (HCC)    • Vitamin D deficiency         Past Surgical History:   Procedure Laterality Date   • COLONOSCOPY  09/2019   • HYSTERECTOMY          Allergies   Allergen Reactions   • Promethazine Other (See Comments)     Family history of hallucinations when using it.          Current Outpatient Medications:   •  atorvastatin (LIPITOR) 10 MG tablet, Take 1 tablet by mouth Every Night., Disp: 90 tablet, Rfl: 1  •  cholecalciferol (VITAMIN D3) 25 MCG (1000 UT) tablet, Take 2,000 Units by mouth Daily., Disp: , Rfl:   •  esomeprazole (nexIUM) 40 MG capsule, Take 1 capsule by mouth Every Morning Before Breakfast., Disp: 90 capsule, Rfl: 1  •  FLUoxetine (PROzac) 40 MG capsule, Take 1 capsule by mouth Daily., Disp: 90 capsule, Rfl: 1  •  gabapentin (NEURONTIN) 100 MG capsule, Take 1 capsule by mouth 3 (Three) Times a Day., Disp: 90 capsule, Rfl: 1  •  lisinopril (PRINIVIL,ZESTRIL) 10 MG tablet, Take 1 tablet by mouth Daily., Disp: 90 tablet, Rfl: 1    Objective   /40 (BP Location: Right arm, Patient Position: Sitting, Cuff Size: Large Adult)   Pulse 86   Temp 97.8 °F (36.6 °C) (Temporal)   Ht 170.2 cm (67\")   Wt 113 kg (249 lb 6.4 oz)   SpO2 95%   BMI 39.06 kg/m²    Estimated body mass index is 39.06 kg/m² as calculated from the following:    Height as of this encounter: 170.2 cm (67\").    Weight as of this encounter: 113 kg (249 lb 6.4 oz).   Physical Exam  Vitals reviewed.   Constitutional:       General: She is not in acute distress.     Appearance: Normal appearance.   HENT:      Head: Normocephalic and atraumatic.   Eyes:      Conjunctiva/sclera: Conjunctivae normal.   Neck:      Comments: No thyroid enlargement  Cardiovascular:      Rate and Rhythm: Normal rate and regular rhythm.      Heart " sounds: Normal heart sounds.   Pulmonary:      Effort: Pulmonary effort is normal.      Breath sounds: Normal breath sounds. No wheezing, rhonchi or rales.   Musculoskeletal:      Cervical back: Neck supple.      Right lower leg: No edema.      Left lower leg: No edema.   Lymphadenopathy:      Cervical: No cervical adenopathy.   Skin:     General: Skin is warm and dry.   Neurological:      General: No focal deficit present.      Mental Status: She is alert.   Psychiatric:         Mood and Affect: Mood normal.         Behavior: Behavior normal.         Thought Content: Thought content normal.         Judgment: Judgment normal.        Result Review :           Assessment and Plan    Diagnoses and all orders for this visit:    1. Essential hypertension (Primary)  Comments:  Stable on medication and to continue.  Orders:  -     lisinopril (PRINIVIL,ZESTRIL) 10 MG tablet; Take 1 tablet by mouth Daily.  Dispense: 90 tablet; Refill: 1    2. Hyperlipidemia, unspecified hyperlipidemia type  Comments:  Stable on medication continue.  Orders:  -     atorvastatin (LIPITOR) 10 MG tablet; Take 1 tablet by mouth Every Night.  Dispense: 90 tablet; Refill: 1    3. Degenerative disc disease, lumbar  Comments:  DUANE reviewed. Signed contract last visit. Taper on.   Orders:  -     gabapentin (NEURONTIN) 100 MG capsule; Take 1 capsule by mouth 3 (Three) Times a Day.  Dispense: 90 capsule; Refill: 1    4. Mild recurrent major depression (HCC)  Comments:  Stable on medication and to continue.  Orders:  -     FLUoxetine (PROzac) 40 MG capsule; Take 1 capsule by mouth Daily.  Dispense: 90 capsule; Refill: 1    5. Gastro-esophageal reflux disease without esophagitis  Comments:  Stable on medication and to continue.  Orders:  -     esomeprazole (nexIUM) 40 MG capsule; Take 1 capsule by mouth Every Morning Before Breakfast.  Dispense: 90 capsule; Refill: 1    6. Need for hepatitis C screening test  -     Hepatitis C Antibody;  Future      Follow Up     Patient was given instructions and counseling regarding her condition or for health maintenance advice. Please see specific information pulled into the AVS if appropriate.   No follow-ups on file.    SILVANA Rodriguez

## 2022-03-31 ENCOUNTER — LAB (OUTPATIENT)
Dept: LAB | Facility: HOSPITAL | Age: 67
End: 2022-03-31

## 2022-03-31 ENCOUNTER — TELEPHONE (OUTPATIENT)
Dept: INTERNAL MEDICINE | Facility: CLINIC | Age: 67
End: 2022-03-31

## 2022-03-31 DIAGNOSIS — Z11.59 NEED FOR HEPATITIS C SCREENING TEST: ICD-10-CM

## 2022-03-31 DIAGNOSIS — E78.5 HYPERLIPIDEMIA, UNSPECIFIED HYPERLIPIDEMIA TYPE: Chronic | ICD-10-CM

## 2022-03-31 DIAGNOSIS — E55.9 VITAMIN D DEFICIENCY: ICD-10-CM

## 2022-03-31 DIAGNOSIS — R73.01 IMPAIRED FASTING GLUCOSE: ICD-10-CM

## 2022-03-31 DIAGNOSIS — I10 ESSENTIAL HYPERTENSION: ICD-10-CM

## 2022-03-31 LAB
25(OH)D3 SERPL-MCNC: 32.5 NG/ML (ref 30–100)
ALBUMIN SERPL-MCNC: 4.6 G/DL (ref 3.5–5.2)
ALBUMIN/GLOB SERPL: 1.8 G/DL
ALP SERPL-CCNC: 101 U/L (ref 39–117)
ALT SERPL W P-5'-P-CCNC: 27 U/L (ref 1–33)
ANION GAP SERPL CALCULATED.3IONS-SCNC: 10.3 MMOL/L (ref 5–15)
AST SERPL-CCNC: 16 U/L (ref 1–32)
BASOPHILS # BLD AUTO: 0.03 10*3/MM3 (ref 0–0.2)
BASOPHILS NFR BLD AUTO: 0.5 % (ref 0–1.5)
BILIRUB SERPL-MCNC: 0.2 MG/DL (ref 0–1.2)
BUN SERPL-MCNC: 14 MG/DL (ref 8–23)
BUN/CREAT SERPL: 15.4 (ref 7–25)
CALCIUM SPEC-SCNC: 9.5 MG/DL (ref 8.6–10.5)
CHLORIDE SERPL-SCNC: 105 MMOL/L (ref 98–107)
CHOLEST SERPL-MCNC: 197 MG/DL (ref 0–200)
CO2 SERPL-SCNC: 22.7 MMOL/L (ref 22–29)
CREAT SERPL-MCNC: 0.91 MG/DL (ref 0.57–1)
DEPRECATED RDW RBC AUTO: 43.5 FL (ref 37–54)
EGFRCR SERPLBLD CKD-EPI 2021: 68.9 ML/MIN/1.73
EOSINOPHIL # BLD AUTO: 0.14 10*3/MM3 (ref 0–0.4)
EOSINOPHIL NFR BLD AUTO: 2.2 % (ref 0.3–6.2)
ERYTHROCYTE [DISTWIDTH] IN BLOOD BY AUTOMATED COUNT: 12.8 % (ref 12.3–15.4)
GLOBULIN UR ELPH-MCNC: 2.5 GM/DL
GLUCOSE SERPL-MCNC: 101 MG/DL (ref 65–99)
HBA1C MFR BLD: 6.2 % (ref 4.8–5.6)
HCT VFR BLD AUTO: 39.9 % (ref 34–46.6)
HCV AB SER DONR QL: NORMAL
HDLC SERPL-MCNC: 44 MG/DL (ref 40–60)
HGB BLD-MCNC: 13.4 G/DL (ref 12–15.9)
IMM GRANULOCYTES # BLD AUTO: 0.01 10*3/MM3 (ref 0–0.05)
IMM GRANULOCYTES NFR BLD AUTO: 0.2 % (ref 0–0.5)
LDLC SERPL CALC-MCNC: 105 MG/DL (ref 0–100)
LDLC/HDLC SERPL: 2.2 {RATIO}
LYMPHOCYTES # BLD AUTO: 2.64 10*3/MM3 (ref 0.7–3.1)
LYMPHOCYTES NFR BLD AUTO: 41.3 % (ref 19.6–45.3)
MCH RBC QN AUTO: 31.5 PG (ref 26.6–33)
MCHC RBC AUTO-ENTMCNC: 33.6 G/DL (ref 31.5–35.7)
MCV RBC AUTO: 93.7 FL (ref 79–97)
MONOCYTES # BLD AUTO: 0.43 10*3/MM3 (ref 0.1–0.9)
MONOCYTES NFR BLD AUTO: 6.7 % (ref 5–12)
NEUTROPHILS NFR BLD AUTO: 3.15 10*3/MM3 (ref 1.7–7)
NEUTROPHILS NFR BLD AUTO: 49.1 % (ref 42.7–76)
NRBC BLD AUTO-RTO: 0 /100 WBC (ref 0–0.2)
PLATELET # BLD AUTO: 347 10*3/MM3 (ref 140–450)
PMV BLD AUTO: 10.7 FL (ref 6–12)
POTASSIUM SERPL-SCNC: 4.3 MMOL/L (ref 3.5–5.2)
PROT SERPL-MCNC: 7.1 G/DL (ref 6–8.5)
RBC # BLD AUTO: 4.26 10*6/MM3 (ref 3.77–5.28)
SODIUM SERPL-SCNC: 138 MMOL/L (ref 136–145)
T4 FREE SERPL-MCNC: 1 NG/DL (ref 0.93–1.7)
TRIGL SERPL-MCNC: 281 MG/DL (ref 0–150)
TSH SERPL DL<=0.05 MIU/L-ACNC: 2.02 UIU/ML (ref 0.27–4.2)
VLDLC SERPL-MCNC: 48 MG/DL (ref 5–40)
WBC NRBC COR # BLD: 6.4 10*3/MM3 (ref 3.4–10.8)

## 2022-03-31 PROCEDURE — 85025 COMPLETE CBC W/AUTO DIFF WBC: CPT

## 2022-03-31 PROCEDURE — 83036 HEMOGLOBIN GLYCOSYLATED A1C: CPT

## 2022-03-31 PROCEDURE — 84439 ASSAY OF FREE THYROXINE: CPT

## 2022-03-31 PROCEDURE — 80053 COMPREHEN METABOLIC PANEL: CPT

## 2022-03-31 PROCEDURE — 84443 ASSAY THYROID STIM HORMONE: CPT

## 2022-03-31 PROCEDURE — 82306 VITAMIN D 25 HYDROXY: CPT

## 2022-03-31 PROCEDURE — 86803 HEPATITIS C AB TEST: CPT

## 2022-03-31 PROCEDURE — 36415 COLL VENOUS BLD VENIPUNCTURE: CPT

## 2022-03-31 PROCEDURE — 80061 LIPID PANEL: CPT

## 2022-04-01 DIAGNOSIS — E78.5 HYPERLIPIDEMIA, UNSPECIFIED HYPERLIPIDEMIA TYPE: Primary | ICD-10-CM

## 2022-04-01 DIAGNOSIS — E55.9 VITAMIN D DEFICIENCY: ICD-10-CM

## 2022-04-01 DIAGNOSIS — R73.01 IMPAIRED FASTING GLUCOSE: ICD-10-CM

## 2022-04-01 DIAGNOSIS — I10 ESSENTIAL HYPERTENSION: ICD-10-CM

## 2022-04-01 NOTE — PROGRESS NOTES
Labs show prediabetes with hemoglobin A1c at 6.2.  Lipid panel shows elevated triglycerides likely due to increased glucose levels.  Recommend controlling diet with decreased concentrated sweets and fewer carbs.  Recommend increase activity/exercise.  Both diet and exercise changes should promote weight loss which will also improve numbers and health.  Recheck these labs at the next visit.

## 2022-05-25 ENCOUNTER — OFFICE VISIT (OUTPATIENT)
Dept: INTERNAL MEDICINE | Facility: CLINIC | Age: 67
End: 2022-05-25

## 2022-05-25 VITALS
DIASTOLIC BLOOD PRESSURE: 82 MMHG | WEIGHT: 246.8 LBS | OXYGEN SATURATION: 97 % | SYSTOLIC BLOOD PRESSURE: 119 MMHG | HEIGHT: 67 IN | BODY MASS INDEX: 38.74 KG/M2 | HEART RATE: 78 BPM | TEMPERATURE: 97.7 F

## 2022-05-25 DIAGNOSIS — M47.9 SPONDYLOSIS: ICD-10-CM

## 2022-05-25 DIAGNOSIS — M51.36 DEGENERATIVE DISC DISEASE, LUMBAR: ICD-10-CM

## 2022-05-25 DIAGNOSIS — M54.50 CHRONIC LOW BACK PAIN, UNSPECIFIED BACK PAIN LATERALITY, UNSPECIFIED WHETHER SCIATICA PRESENT: Primary | ICD-10-CM

## 2022-05-25 DIAGNOSIS — G89.29 CHRONIC LOW BACK PAIN, UNSPECIFIED BACK PAIN LATERALITY, UNSPECIFIED WHETHER SCIATICA PRESENT: Primary | ICD-10-CM

## 2022-05-25 PROCEDURE — 99214 OFFICE O/P EST MOD 30 MIN: CPT | Performed by: NURSE PRACTITIONER

## 2022-05-25 RX ORDER — GABAPENTIN 100 MG/1
100 CAPSULE ORAL 3 TIMES DAILY
Qty: 90 CAPSULE | Refills: 0 | Status: SHIPPED | OUTPATIENT
Start: 2022-05-25 | End: 2022-12-07

## 2022-08-09 ENCOUNTER — OFFICE VISIT (OUTPATIENT)
Dept: NEUROSURGERY | Facility: CLINIC | Age: 67
End: 2022-08-09

## 2022-08-09 VITALS
BODY MASS INDEX: 38.86 KG/M2 | DIASTOLIC BLOOD PRESSURE: 80 MMHG | HEIGHT: 67 IN | WEIGHT: 247.6 LBS | SYSTOLIC BLOOD PRESSURE: 114 MMHG

## 2022-08-09 DIAGNOSIS — M54.50 CHRONIC MIDLINE LOW BACK PAIN WITHOUT SCIATICA: Primary | ICD-10-CM

## 2022-08-09 DIAGNOSIS — M47.816 FACET ARTHROPATHY, LUMBAR: ICD-10-CM

## 2022-08-09 DIAGNOSIS — G89.29 CHRONIC MIDLINE LOW BACK PAIN WITHOUT SCIATICA: Primary | ICD-10-CM

## 2022-08-09 DIAGNOSIS — M51.36 DDD (DEGENERATIVE DISC DISEASE), LUMBAR: ICD-10-CM

## 2022-08-09 PROCEDURE — 99203 OFFICE O/P NEW LOW 30 MIN: CPT | Performed by: NEUROLOGICAL SURGERY

## 2022-08-09 RX ORDER — HYDROCODONE BITARTRATE AND ACETAMINOPHEN 5; 325 MG/1; MG/1
1 TABLET ORAL EVERY 6 HOURS PRN
COMMUNITY

## 2022-08-09 NOTE — PROGRESS NOTES
"Chief Complaint  Back Pain    Subjective          Zahira Perea who is a 67 y.o. year old female who presents to [facility] for Evaluation of the Spine.     The patient complains of pain located in the lumbar spine.  Patients states the pain has been present for 2 years.  The pain came on acutely when spraying weeds in the driveway.  The pain scale level is 7.  The pain radiates to the bilateral legs above the knees. The back pain is greater than the leg pain.  The pain is waxing/waning and described as sharp and aching.  The pain is worse in the morning. Patient states prolonged standing, prolonged walking and housework makes the pain worse.  Patient states pain medication makes the pain better.    Associated Symptoms Include: Cramping in the toes as well as some intermittent numbness and tingling in the legs. She feels weak in her lower back.     Conservative Interventions Include: She has had pain medication, NSAIDs, pain medication and gabapentin. The pain medication is the most helpful. She had a series of injections and nerve ablation a year ago.    Was this the result of an injury or accident?: No    History of Previous Spinal Surgery?: No    This patient  reports that she has been smoking electronic cigarette. She has been smoking about 0.50 packs per day. She has never used smokeless tobacco.    Review of Systems   Musculoskeletal: Positive for back pain and myalgias.   Neurological: Positive for numbness.        Objective   Vital Signs:   /80 (BP Location: Left arm, Patient Position: Sitting)   Ht 170.2 cm (67\")   Wt 112 kg (247 lb 9.6 oz)   BMI 38.78 kg/m²       Physical Exam  Constitutional:       Comments: BMI 38.78   Cardiovascular:      Comments: No edema  Pulmonary:      Effort: Pulmonary effort is normal.   Neurological:      Mental Status: She is alert.      Sensory: No sensory deficit.      Motor: No weakness.      Deep Tendon Reflexes: Reflexes " normal.   Psychiatric:         Mood and Affect: Mood normal.     I personally reviewed the patient's MRI scan which shows multilevel DDD worse at L4-5. She has no notable canal or foraminal narrowing.       Assessment and Plan    Diagnoses and all orders for this visit:    1. Chronic midline low back pain without sciatica (Primary)    2. DDD (degenerative disc disease), lumbar    3. Facet arthropathy, lumbar      We discussed the importance of core strengthening, avoidance of activities that worsen the pain, nicotine cessation and maintenance of healthy weight. Surgery for patients with multilevel degenerative disc disease is not typically successful with the risks outweighing the benefit.      We discussed the importance of smoking/nicotine cessation. Smoking/nicotine use has multiple health risks. In particular related to the spine, nicotine increases the incidence of lower back pain, speeds up the progression of degenerative disc disease and dramatically reduces healing after spine surgery (particularly a fusion operation).     Follow Up   No follow-ups on file.  Patient was given instructions and counseling regarding her condition or for health maintenance advice. Please see specific information pulled into the AVS if appropriate.

## 2022-09-16 ENCOUNTER — LAB (OUTPATIENT)
Dept: INTERNAL MEDICINE | Facility: CLINIC | Age: 67
End: 2022-09-16

## 2022-09-16 DIAGNOSIS — I10 ESSENTIAL HYPERTENSION: ICD-10-CM

## 2022-09-16 DIAGNOSIS — E78.5 HYPERLIPIDEMIA, UNSPECIFIED HYPERLIPIDEMIA TYPE: ICD-10-CM

## 2022-09-16 DIAGNOSIS — R73.01 IMPAIRED FASTING GLUCOSE: ICD-10-CM

## 2022-09-16 DIAGNOSIS — E55.9 VITAMIN D DEFICIENCY: ICD-10-CM

## 2022-09-16 LAB
25(OH)D3 SERPL-MCNC: 42.6 NG/ML (ref 30–100)
ALBUMIN SERPL-MCNC: 4.8 G/DL (ref 3.5–5.2)
ALBUMIN/GLOB SERPL: 1.8 G/DL
ALP SERPL-CCNC: 108 U/L (ref 39–117)
ALT SERPL W P-5'-P-CCNC: 20 U/L (ref 1–33)
ANION GAP SERPL CALCULATED.3IONS-SCNC: 13.3 MMOL/L (ref 5–15)
AST SERPL-CCNC: 22 U/L (ref 1–32)
BASOPHILS # BLD AUTO: 0.05 10*3/MM3 (ref 0–0.2)
BASOPHILS NFR BLD AUTO: 0.9 % (ref 0–1.5)
BILIRUB SERPL-MCNC: 0.3 MG/DL (ref 0–1.2)
BUN SERPL-MCNC: 17 MG/DL (ref 8–23)
BUN/CREAT SERPL: 15 (ref 7–25)
CALCIUM SPEC-SCNC: 10.7 MG/DL (ref 8.6–10.5)
CHLORIDE SERPL-SCNC: 102 MMOL/L (ref 98–107)
CHOLEST SERPL-MCNC: 177 MG/DL (ref 0–200)
CO2 SERPL-SCNC: 22.7 MMOL/L (ref 22–29)
CREAT SERPL-MCNC: 1.13 MG/DL (ref 0.57–1)
DEPRECATED RDW RBC AUTO: 44.2 FL (ref 37–54)
EGFRCR SERPLBLD CKD-EPI 2021: 53.1 ML/MIN/1.73
EOSINOPHIL # BLD AUTO: 0.12 10*3/MM3 (ref 0–0.4)
EOSINOPHIL NFR BLD AUTO: 2.1 % (ref 0.3–6.2)
ERYTHROCYTE [DISTWIDTH] IN BLOOD BY AUTOMATED COUNT: 13.1 % (ref 12.3–15.4)
GLOBULIN UR ELPH-MCNC: 2.6 GM/DL
GLUCOSE SERPL-MCNC: 99 MG/DL (ref 65–99)
HBA1C MFR BLD: 5.9 % (ref 4.8–5.6)
HCT VFR BLD AUTO: 42 % (ref 34–46.6)
HDLC SERPL-MCNC: 42 MG/DL (ref 40–60)
HGB BLD-MCNC: 13.9 G/DL (ref 12–15.9)
IMM GRANULOCYTES # BLD AUTO: 0.01 10*3/MM3 (ref 0–0.05)
IMM GRANULOCYTES NFR BLD AUTO: 0.2 % (ref 0–0.5)
LDLC SERPL CALC-MCNC: 103 MG/DL (ref 0–100)
LDLC/HDLC SERPL: 2.34 {RATIO}
LYMPHOCYTES # BLD AUTO: 2.23 10*3/MM3 (ref 0.7–3.1)
LYMPHOCYTES NFR BLD AUTO: 38.5 % (ref 19.6–45.3)
MCH RBC QN AUTO: 31.2 PG (ref 26.6–33)
MCHC RBC AUTO-ENTMCNC: 33.1 G/DL (ref 31.5–35.7)
MCV RBC AUTO: 94.4 FL (ref 79–97)
MONOCYTES # BLD AUTO: 0.33 10*3/MM3 (ref 0.1–0.9)
MONOCYTES NFR BLD AUTO: 5.7 % (ref 5–12)
NEUTROPHILS NFR BLD AUTO: 3.05 10*3/MM3 (ref 1.7–7)
NEUTROPHILS NFR BLD AUTO: 52.6 % (ref 42.7–76)
NRBC BLD AUTO-RTO: 0 /100 WBC (ref 0–0.2)
PLATELET # BLD AUTO: 385 10*3/MM3 (ref 140–450)
PMV BLD AUTO: 10.9 FL (ref 6–12)
POTASSIUM SERPL-SCNC: 4.7 MMOL/L (ref 3.5–5.2)
PROT SERPL-MCNC: 7.4 G/DL (ref 6–8.5)
RBC # BLD AUTO: 4.45 10*6/MM3 (ref 3.77–5.28)
SODIUM SERPL-SCNC: 138 MMOL/L (ref 136–145)
T4 FREE SERPL-MCNC: 0.99 NG/DL (ref 0.93–1.7)
TRIGL SERPL-MCNC: 183 MG/DL (ref 0–150)
TSH SERPL DL<=0.05 MIU/L-ACNC: 1.09 UIU/ML (ref 0.27–4.2)
VLDLC SERPL-MCNC: 32 MG/DL (ref 5–40)
WBC NRBC COR # BLD: 5.79 10*3/MM3 (ref 3.4–10.8)

## 2022-09-16 PROCEDURE — 85025 COMPLETE CBC W/AUTO DIFF WBC: CPT | Performed by: NURSE PRACTITIONER

## 2022-09-16 PROCEDURE — 36415 COLL VENOUS BLD VENIPUNCTURE: CPT | Performed by: NURSE PRACTITIONER

## 2022-09-16 PROCEDURE — 84443 ASSAY THYROID STIM HORMONE: CPT | Performed by: NURSE PRACTITIONER

## 2022-09-16 PROCEDURE — 80053 COMPREHEN METABOLIC PANEL: CPT | Performed by: NURSE PRACTITIONER

## 2022-09-16 PROCEDURE — 84439 ASSAY OF FREE THYROXINE: CPT | Performed by: NURSE PRACTITIONER

## 2022-09-16 PROCEDURE — 80061 LIPID PANEL: CPT | Performed by: NURSE PRACTITIONER

## 2022-09-16 PROCEDURE — 82306 VITAMIN D 25 HYDROXY: CPT | Performed by: NURSE PRACTITIONER

## 2022-09-16 PROCEDURE — 83036 HEMOGLOBIN GLYCOSYLATED A1C: CPT | Performed by: NURSE PRACTITIONER

## 2022-09-18 NOTE — PROGRESS NOTES
Chief Complaint  Annual Exam (Physical, recent labs done. Patient would like to discuss weight loss. She stated she is trying dieting. )  Subjective        History of Present Illness  Zahira Perea presents to [facility] for:     1.  Her annual physical exam.     2. Follow-up hypertension, hyperlipidemia, vitamin d deficiency chronic kidney disease and prediabetes. Recent labs reviewed. She reports neurosurgery told he she needs to strengthen her core and lose weight to improved her back pain.     Current Outpatient Medications:   •  atorvastatin (LIPITOR) 10 MG tablet, Take 1 tablet by mouth Every Night., Disp: 90 tablet, Rfl: 1  •  cholecalciferol (VITAMIN D3) 25 MCG (1000 UT) tablet, Take 2,000 Units by mouth Daily., Disp: , Rfl:   •  esomeprazole (nexIUM) 40 MG capsule, Take 1 capsule by mouth Every Morning Before Breakfast., Disp: 90 capsule, Rfl: 1  •  FLUoxetine (PROzac) 40 MG capsule, Take 1 capsule by mouth Daily., Disp: 90 capsule, Rfl: 1  •  gabapentin (NEURONTIN) 100 MG capsule, Take 1 capsule by mouth 3 (Three) Times a Day. (Patient taking differently: Take 300 mg by mouth 2 (Two) Times a Day.), Disp: 90 capsule, Rfl: 0  •  gabapentin (NEURONTIN) 300 MG capsule, gabapentin 300 mg capsule  Take 1 capsule 3 times a day by oral route as directed for 30 days., Disp: , Rfl:   •  HYDROcodone-acetaminophen (NORCO) 5-325 MG per tablet, Take 1 tablet by mouth Every 6 (Six) Hours As Needed., Disp: , Rfl:   •  lisinopril (PRINIVIL,ZESTRIL) 10 MG tablet, Take 1 tablet by mouth Daily., Disp: 90 tablet, Rfl: 1  •  topiramate (TOPAMAX) 25 MG tablet, topiramate 25 mg tablet, Disp: , Rfl:   •  Semaglutide,0.25 or 0.5MG/DOS, (Ozempic, 0.25 or 0.5 MG/DOSE,) 2 MG/1.5ML solution pen-injector, Inject 0.25 mg under the skin into the appropriate area as directed 1 (One) Time Per Week., Disp: 1.5 mL, Rfl: 1  Allergies   Allergen Reactions   • Promethazine Other (See Comments)     Family  "history of hallucinations when using it.      Past Medical History:   Diagnosis Date   • Arthritis 2018   • Cervical disc disorder 2020   • Depression    • Disorder of white blood cells    • Essential (primary) hypertension    • Gastro-esophageal reflux disease without esophagitis    • Hypercholesterolemia    • Hyperlipidemia, unspecified    • Low back pain 2020   • Low blood sugar    • Lumbosacral disc disease 2020   • Major depressive disorder, recurrent (HCC)    • Stage 3 chronic kidney disease (HCC)    • Vitamin D deficiency       Past Surgical History:   Procedure Laterality Date   • COLONOSCOPY  09/2019   • EPIDURAL BLOCK  2021   • HYSTERECTOMY        Objective   /80 (BP Location: Right arm, Patient Position: Sitting, Cuff Size: Large Adult)   Pulse 64   Temp 97.2 °F (36.2 °C) (Temporal)   Ht 170.2 cm (67\")   Wt 111 kg (244 lb)   SpO2 98%   BMI 38.22 kg/m²    Estimated body mass index is 38.22 kg/m² as calculated from the following:    Height as of this encounter: 170.2 cm (67\").    Weight as of this encounter: 111 kg (244 lb).   Physical Exam  Vitals reviewed. Exam conducted with a chaperone present.   Constitutional:       General: She is not in acute distress.     Appearance: Normal appearance.   HENT:      Head: Normocephalic and atraumatic.      Right Ear: Tympanic membrane and ear canal normal.      Left Ear: There is impacted cerumen.   Eyes:      Conjunctiva/sclera: Conjunctivae normal.   Cardiovascular:      Rate and Rhythm: Normal rate and regular rhythm.      Heart sounds: Normal heart sounds. No murmur heard.  Pulmonary:      Effort: Pulmonary effort is normal.      Breath sounds: Normal breath sounds. No wheezing, rhonchi or rales.   Chest:   Breasts:      Right: Normal. No axillary adenopathy.      Left: Normal. No axillary adenopathy.       Abdominal:      General: There is no distension.      Palpations: Abdomen is soft. There is no mass.      Tenderness: There is no abdominal " tenderness.   Genitourinary:     General: Normal vulva.      Exam position: Lithotomy position.   Musculoskeletal:      Right lower leg: No edema.      Left lower leg: No edema.   Lymphadenopathy:      Cervical: No cervical adenopathy.      Upper Body:      Right upper body: No axillary adenopathy.      Left upper body: No axillary adenopathy.   Skin:     General: Skin is warm and dry.      Coloration: Skin is not jaundiced or pale.   Neurological:      General: No focal deficit present.      Mental Status: She is alert.   Psychiatric:         Mood and Affect: Mood normal.         Thought Content: Thought content normal.        Result Review :   The following data was reviewed by: SILVANA Rodriguez on 09/19/2022:  CMP    CMP 3/31/22 9/16/22   Glucose 101 (A) 99   BUN 14 17   Creatinine 0.91 1.13 (A)   Sodium 138 138   Potassium 4.3 4.7   Chloride 105 102   Calcium 9.5 10.7 (A)   Albumin 4.60 4.80   Total Bilirubin 0.2 0.3   Alkaline Phosphatase 101 108   AST (SGOT) 16 22   ALT (SGPT) 27 20   (A) Abnormal value            CBC w/diff    CBC w/Diff 3/31/22 9/16/22   WBC 6.40 5.79   RBC 4.26 4.45   Hemoglobin 13.4 13.9   Hematocrit 39.9 42.0   MCV 93.7 94.4   MCH 31.5 31.2   MCHC 33.6 33.1   RDW 12.8 13.1   Platelets 347 385   Neutrophil Rel % 49.1 52.6   Immature Granulocyte Rel % 0.2 0.2   Lymphocyte Rel % 41.3 38.5   Monocyte Rel % 6.7 5.7   Eosinophil Rel % 2.2 2.1   Basophil Rel % 0.5 0.9           Lipid Panel    Lipid Panel 3/31/22 9/16/22   Total Cholesterol 197 177   Triglycerides 281 (A) 183 (A)   HDL Cholesterol 44 42   VLDL Cholesterol 48 (A) 32   LDL Cholesterol  105 (A) 103 (A)   LDL/HDL Ratio 2.20 2.34   (A) Abnormal value            TSH    TSH 3/31/22 9/16/22   TSH 2.020 1.090           A1C Last 3 Results    HGBA1C Last 3 Results 3/31/22 9/16/22   Hemoglobin A1C 6.20 (A) 5.90 (A)   (A) Abnormal value          Vitamin D 25 Hydroxy (09/16/2022 11:12)    Data reviewed: Consultant notes Progress Notes by  Toro Blackmon MD (08/09/2022 10:30)   Ear Cerumen Removal    Date/Time: 9/19/2022 4:39 PM  Performed by: Paulette Hernandez APRN  Authorized by: Paulette Hernandez APRN   Location details: left ear  Patient tolerance: patient tolerated the procedure well with no immediate complications  Procedure type: instrumentation, irrigation   Sedation:  Patient sedated: no                Assessment and Plan    Diagnoses and all orders for this visit:    1. Annual physical exam (Primary)  Comments:  Discussed healthy diet, exercise, adequate sleep, cancer screening, immunizations, and preventative care. Annual eye exam. She has dentures.     2. Essential hypertension  Comments:  Stable on lisinopril 10 mg daily and to continue.  Orders:  -     Comprehensive Metabolic Panel; Future    3. Hyperlipidemia, unspecified hyperlipidemia type  Comments:  Stable on atorvastatin 10 mg daily and to continue.  Orders:  -     Lipid Panel; Future    4. Prediabetes  Comments:  Improved HA1c is 5.90. Monitor. Start Ozempic 0.25 mg weekly. She is to  call back if not covered under insurance.   Orders:  -     Hemoglobin A1c; Future    5. Morbid (severe) obesity due to excess calories (HCC)  Comments:  Discussed wt loss and diet.     6. Vitamin D deficiency  Comments:  Level normal and to continue to monitor.  Orders:  -     Vitamin D 25 Hydroxy; Future    7. Impacted cerumen of left ear  -     Cerumen Removal    8. Encounter for screening mammogram for malignant neoplasm of breast  -     Mammo Screening Bilateral With CAD; Future    9. Post-menopausal  -     DEXA Bone Density Axial; Future    Other orders  -     Semaglutide,0.25 or 0.5MG/DOS, (Ozempic, 0.25 or 0.5 MG/DOSE,) 2 MG/1.5ML solution pen-injector; Inject 0.25 mg under the skin into the appropriate area as directed 1 (One) Time Per Week.  Dispense: 1.5 mL; Refill: 1          Follow Up     Patient was given instructions and counseling regarding her condition or for health maintenance  advice. Please see specific information pulled into the AVS if appropriate.   Return in about 6 months (around 3/19/2023) for Recheck.    SILVANA Rodriguez

## 2022-09-19 ENCOUNTER — OFFICE VISIT (OUTPATIENT)
Dept: INTERNAL MEDICINE | Facility: CLINIC | Age: 67
End: 2022-09-19

## 2022-09-19 VITALS
DIASTOLIC BLOOD PRESSURE: 80 MMHG | HEIGHT: 67 IN | BODY MASS INDEX: 38.3 KG/M2 | SYSTOLIC BLOOD PRESSURE: 119 MMHG | TEMPERATURE: 97.2 F | HEART RATE: 64 BPM | WEIGHT: 244 LBS | OXYGEN SATURATION: 98 %

## 2022-09-19 DIAGNOSIS — R73.03 PREDIABETES: ICD-10-CM

## 2022-09-19 DIAGNOSIS — Z12.31 ENCOUNTER FOR SCREENING MAMMOGRAM FOR MALIGNANT NEOPLASM OF BREAST: ICD-10-CM

## 2022-09-19 DIAGNOSIS — Z78.0 POST-MENOPAUSAL: ICD-10-CM

## 2022-09-19 DIAGNOSIS — Z00.00 ANNUAL PHYSICAL EXAM: Primary | ICD-10-CM

## 2022-09-19 DIAGNOSIS — H61.22 IMPACTED CERUMEN OF LEFT EAR: ICD-10-CM

## 2022-09-19 DIAGNOSIS — E66.01 MORBID (SEVERE) OBESITY DUE TO EXCESS CALORIES: ICD-10-CM

## 2022-09-19 DIAGNOSIS — I10 ESSENTIAL HYPERTENSION: Chronic | ICD-10-CM

## 2022-09-19 DIAGNOSIS — E78.5 HYPERLIPIDEMIA, UNSPECIFIED HYPERLIPIDEMIA TYPE: Chronic | ICD-10-CM

## 2022-09-19 DIAGNOSIS — E55.9 VITAMIN D DEFICIENCY: ICD-10-CM

## 2022-09-19 PROCEDURE — 99214 OFFICE O/P EST MOD 30 MIN: CPT | Performed by: NURSE PRACTITIONER

## 2022-09-19 PROCEDURE — 69210 REMOVE IMPACTED EAR WAX UNI: CPT | Performed by: NURSE PRACTITIONER

## 2022-09-19 PROCEDURE — 99397 PER PM REEVAL EST PAT 65+ YR: CPT | Performed by: NURSE PRACTITIONER

## 2022-09-19 RX ORDER — SEMAGLUTIDE 1.34 MG/ML
0.25 INJECTION, SOLUTION SUBCUTANEOUS WEEKLY
Qty: 1.5 ML | Refills: 1 | Status: SHIPPED | OUTPATIENT
Start: 2022-09-19 | End: 2022-11-10

## 2022-09-19 RX ORDER — GABAPENTIN 300 MG/1
CAPSULE ORAL
COMMUNITY

## 2022-09-19 RX ORDER — TOPIRAMATE 25 MG/1
TABLET ORAL
COMMUNITY

## 2022-09-19 NOTE — PATIENT INSTRUCTIONS
Prediabetes  Prediabetes is when your blood sugar (blood glucose) level is higher than normal but not high enough for you to be diagnosed with type 2 diabetes. Having prediabetes puts you at risk for developing type 2 diabetes (type 2 diabetes mellitus).  With certain lifestyle changes, you may be able to prevent or delay the onset of type 2 diabetes. This is important because type 2 diabetes can lead to serious complications, such as:  Heart disease.  Stroke.  Blindness.  Kidney disease.  Depression.  Poor circulation in the feet and legs. In severe cases, this could lead to surgical removal of a leg (amputation).  What are the causes?  The exact cause of prediabetes is not known. It may result from insulin resistance. Insulin resistance develops when cells in the body do not respond properly to insulin that the body makes. This can cause excess glucose to build up in the blood. High blood glucose (hyperglycemia) can develop.  What increases the risk?  The following factors may make you more likely to develop this condition:  You have a family member with type 2 diabetes.  You are older than 45 years.  You had a temporary form of diabetes during a pregnancy (gestational diabetes).  You had polycystic ovary syndrome (PCOS).  You are overweight or obese.  You are inactive (sedentary).  You have a history of heart disease, including problems with cholesterol levels, high levels of blood fats, or high blood pressure.  What are the signs or symptoms?  You may have no symptoms. If you do have symptoms, they may include:  Increased hunger.  Increased thirst.  Increased urination.  Vision changes, such as blurry vision.  Tiredness (fatigue).  How is this diagnosed?  This condition can be diagnosed with blood tests. Your blood glucose may be checked with one or more of the following tests:  A fasting blood glucose (FBG) test. You will not be allowed to eat (you will fast) for at least 8 hours before a blood sample is  taken.  An A1C blood test (hemoglobin A1C). This test provides information about blood glucose levels over the previous 2?3 months.  An oral glucose tolerance test (OGTT). This test measures your blood glucose at two points in time:  After fasting. This is your baseline level.  Two hours after you drink a beverage that contains glucose.  You may be diagnosed with prediabetes if:  Your FBG is 100?125 mg/dL (5.6-6.9 mmol/L).  Your A1C level is 5.7?6.4% (39-46 mmol/mol).  Your OGTT result is 140?199 mg/dL (7.8-11 mmol/L).  These blood tests may be repeated to confirm your diagnosis.  How is this treated?  Treatment may include dietary and lifestyle changes to help lower your blood glucose and prevent type 2 diabetes from developing. In some cases, medicine may be prescribed to help lower the risk of type 2 diabetes.  Follow these instructions at home:  Nutrition    Follow a healthy meal plan. This includes eating lean proteins, whole grains, legumes, fresh fruits and vegetables, low-fat dairy products, and healthy fats.  Follow instructions from your health care provider about eating or drinking restrictions.  Meet with a dietitian to create a healthy eating plan that is right for you.  Lifestyle  Do moderate-intensity exercise for at least 30 minutes a day on 5 or more days each week, or as told by your health care provider. A mix of activities may be best, such as:  Brisk walking, swimming, biking, and weight lifting.  Lose weight as told by your health care provider. Losing 5-7% of your body weight can reverse insulin resistance.  Do not drink alcohol if:  Your health care provider tells you not to drink.  You are pregnant, may be pregnant, or are planning to become pregnant.  If you drink alcohol:  Limit how much you use to:  0-1 drink a day for women.  0-2 drinks a day for men.  Be aware of how much alcohol is in your drink. In the [place] one drink equals one 12 oz bottle of beer (355 mL), one 5 oz glass of wine  (148 mL), or one 1½ oz glass of hard liquor (44 mL).  General instructions  Take over-the-counter and prescription medicines only as told by your health care provider. You may be prescribed medicines that help lower the risk of type 2 diabetes.  Do not use any products that contain nicotine or tobacco, such as cigarettes, e-cigarettes, and chewing tobacco. If you need help quitting, ask your health care provider.  Keep all follow-up visits. This is important.  Where to find more information  American Diabetes Association: www.diabetes.org  Academy of Nutrition and Dietetics: www.eatright.org  American Heart Association: www.heart.org  Contact a health care provider if:  You have any of these symptoms:  Increased hunger.  Increased urination.  Increased thirst.  Fatigue.  Vision changes, such as blurry vision.  Get help right away if you:  Have shortness of breath.  Feel confused.  Vomit or feel like you may vomit.  Summary  Prediabetes is when your blood sugar (blood glucose)level is higher than normal but not high enough for you to be diagnosed with type 2 diabetes.  Having prediabetes puts you at risk for developing type 2 diabetes (type 2 diabetes mellitus).  Make lifestyle changes such as eating a healthy diet and exercising regularly to help prevent diabetes. Lose weight as told by your health care provider.  This information is not intended to replace advice given to you by your health care provider. Make sure you discuss any questions you have with your health care provider.  Document Revised: 03/18/2021 Document Reviewed: 03/18/2021  "[employer] Patient Education © 2022 "[employer] Inc.  Prediabetes Eating Plan  Prediabetes is a condition that causes blood sugar (glucose) levels to be higher than normal. This increases the risk for developing type 2 diabetes (type 2 diabetes mellitus). Working with a health care provider or nutrition specialist (dietitian) to make diet and lifestyle changes can help prevent the  onset of diabetes. These changes may help you:  Control your blood glucose levels.  Improve your cholesterol levels.  Manage your blood pressure.  What are tips for following this plan?  Reading food labels  Read food labels to check the amount of fat, salt (sodium), and sugar in prepackaged foods. Avoid foods that have:  Saturated fats.  Trans fats.  Added sugars.  Avoid foods that have more than 300 milligrams (mg) of sodium per serving. Limit your sodium intake to less than 2,300 mg each day.  Shopping  Avoid buying pre-made and processed foods.  Avoid buying drinks with added sugar.  Cooking  Cook with olive oil. Do not use butter, lard, or ghee.  Bake, broil, grill, steam, or boil foods. Avoid frying.  Meal planning    Work with your dietitian to create an eating plan that is right for you. This may include tracking how many calories you take in each day. Use a food diary, notebook, or mobile application to track what you eat at each meal.  Consider following a Mediterranean diet. This includes:  Eating several servings of fresh fruits and vegetables each day.  Eating fish at least twice a week.  Eating one serving each day of whole grains, beans, nuts, and seeds.  Using olive oil instead of other fats.  Limiting alcohol.  Limiting red meat.  Using nonfat or low-fat dairy products.  Consider following a plant-based diet. This includes dietary choices that focus on eating mostly vegetables and fruit, grains, beans, nuts, and seeds.  If you have high blood pressure, you may need to limit your sodium intake or follow a diet such as the DASH (Dietary Approaches to Stop Hypertension) eating plan. The DASH diet aims to lower high blood pressure.  Lifestyle  Set weight loss goals with help from your health care team. It is recommended that most people with prediabetes lose 7% of their body weight.  Exercise for at least 30 minutes 5 or more days a week.  Attend a support group or seek support from a mental health  counselor.  Take over-the-counter and prescription medicines only as told by your health care provider.  What foods are recommended?  Fruits  Berries. Bananas. Apples. Oranges. Grapes. Papaya. Lm. Pomegranate. Kiwi. Grapefruit. Cherries.  Vegetables  Lettuce. Spinach. Peas. Beets. Cauliflower. Cabbage. Broccoli. Carrots. Tomatoes. Squash. Eggplant. Herbs. Peppers. Onions. Cucumbers. [place] sprouts.  Grains  Whole grains, such as whole-wheat or whole-grain breads, crackers, cereals, and pasta. Unsweetened oatmeal. Bulgur. Barley. Quinoa. Brown rice. Corn or whole-wheat flour tortillas or taco shells.  Meats and other proteins  Seafood. Poultry without skin. Lean cuts of pork and beef. Tofu. Eggs. Nuts. Beans.  Dairy  Low-fat or fat-free dairy products, such as yogurt, cottage cheese, and cheese.  Beverages  Water. Tea. Coffee. Sugar-free or diet soda. [place] water. Low-fat or nonfat milk. Milk alternatives, such as soy or almond milk.  Fats and oils  Olive oil. Canola oil. Sunflower oil. Grapeseed oil. Avocado. Walnuts.  Sweets and desserts  Sugar-free or low-fat pudding. Sugar-free or low-fat ice cream and other frozen treats.  Seasonings and condiments  Herbs. Sodium-free spices. Mustard. Relish. Low-salt, low-sugar ketchup. Low-salt, low-sugar barbecue sauce. Low-fat or fat-free mayonnaise.  The items listed above may not be a complete list of recommended foods and beverages. Contact a dietitian for more information.  What foods are not recommended?  Fruits  Fruits canned with syrup.  Vegetables  Canned vegetables. Frozen vegetables with butter or cream sauce.  Grains  Refined white flour and flour products, such as bread, pasta, snack foods, and cereals.  Meats and other proteins  Fatty cuts of meat. Poultry with skin. Breaded or fried meat. Processed meats.  Dairy  Full-fat yogurt, cheese, or milk.  Beverages  Sweetened drinks, such as iced tea and soda.  Fats and oils  Butter. Lard. Ghee.  Sweets and  desserts  Baked goods, such as cake, cupcakes, pastries, cookies, and cheesecake.  Seasonings and condiments  Spice mixes with added salt. Ketchup. Barbecue sauce. Mayonnaise.  The items listed above may not be a complete list of foods and beverages that are not recommended. Contact a dietitian for more information.  Where to find more information  American Diabetes Association: www.diabetes.org  Summary  You may need to make diet and lifestyle changes to help prevent the onset of diabetes. These changes can help you control blood sugar, improve cholesterol levels, and manage blood pressure.  Set weight loss goals with help from your health care team. It is recommended that most people with prediabetes lose 7% of their body weight.  Consider following a Mediterranean diet. This includes eating plenty of fresh fruits and vegetables, whole grains, beans, nuts, seeds, fish, and low-fat dairy, and using olive oil instead of other fats.  This information is not intended to replace advice given to you by your health care provider. Make sure you discuss any questions you have with your health care provider.  Document Revised: 03/18/2021 Document Reviewed: 03/18/2021  [employer] Patient Education © 2022 [employer]  Calorie Counting for Weight Loss  Calories are units of energy. Your body needs a certain number of calories from food to keep going throughout the day. When you eat or drink more calories than your body needs, your body stores the extra calories mostly as fat. When you eat or drink fewer calories than your body needs, your body burns fat to get the energy it needs.  Calorie counting means keeping track of how many calories you eat and drink each day. Calorie counting can be helpful if you need to lose weight. If you eat fewer calories than your body needs, you should lose weight. Ask your health care provider what a healthy weight is for you.  For calorie counting to work, you will need to eat the right number  of calories each day to lose a healthy amount of weight per week. A dietitian can help you figure out how many calories you need in a day and will suggest ways to reach your calorie goal.  A healthy amount of weight to lose each week is usually 1-2 lb (0.5-0.9 kg). This usually means that your daily calorie intake should be reduced by 500-750 calories.  Eating 1,200-1,500 calories a day can help most women lose weight.  Eating 1,500-1,800 calories a day can help most men lose weight.  What do I need to know about calorie counting?  Work with your health care provider or dietitian to determine how many calories you should get each day. To meet your daily calorie goal, you will need to:  Find out how many calories are in each food that you would like to eat. Try to do this before you eat.  Decide how much of the food you plan to eat.  Keep a food log. Do this by writing down what you ate and how many calories it had.  To successfully lose weight, it is important to balance calorie counting with a healthy lifestyle that includes regular activity.  Where do I find calorie information?  The number of calories in a food can be found on a Nutrition Facts label. If a food does not have a Nutrition Facts label, try to look up the calories online or ask your dietitian for help.  Remember that calories are listed per serving. If you choose to have more than one serving of a food, you will have to multiply the calories per serving by the number of servings you plan to eat. For example, the label on a package of bread might say that a serving size is 1 slice and that there are 90 calories in a serving. If you eat 1 slice, you will have eaten 90 calories. If you eat 2 slices, you will have eaten 180 calories.  How do I keep a food log?  After each time that you eat, record the following in your food log as soon as possible:  What you ate. Be sure to include toppings, sauces, and other extras on the food.  How much you ate. This  can be measured in cups, ounces, or number of items.  How many calories were in each food and drink.  The total number of calories in the food you ate.  Keep your food log near you, such as in a pocket-sized notebook or on an chris or website on your mobile phone. Some programs will calculate calories for you and show you how many calories you have left to meet your daily goal.  What are some portion-control tips?  Know how many calories are in a serving. This will help you know how many servings you can have of a certain food.  Use a measuring cup to measure serving sizes. You could also try weighing out portions on a kitchen scale. With time, you will be able to estimate serving sizes for some foods.  Take time to put servings of different foods on your favorite plates or in your favorite bowls and cups so you know what a serving looks like.  Try not to eat straight from a food's packaging, such as from a bag or box. Eating straight from the package makes it hard to see how much you are eating and can lead to overeating. Put the amount you would like to eat in a cup or on a plate to make sure you are eating the right portion.  Use smaller plates, glasses, and bowls for smaller portions and to prevent overeating.  Try not to multitask. For example, avoid watching TV or using your computer while eating. If it is time to eat, sit down at a table and enjoy your food. This will help you recognize when you are full. It will also help you be more mindful of what and how much you are eating.  What are tips for following this plan?  Reading food labels  Check the calorie count compared with the serving size. The serving size may be smaller than what you are used to eating.  Check the source of the calories. Try to choose foods that are high in protein, fiber, and vitamins, and low in saturated fat, trans fat, and sodium.  Shopping  Read nutrition labels while you shop. This will help you make healthy decisions about which  foods to buy.  Pay attention to nutrition labels for low-fat or fat-free foods. These foods sometimes have the same number of calories or more calories than the full-fat versions. They also often have added sugar, starch, or salt to make up for flavor that was removed with the fat.  Make a grocery list of lower-calorie foods and stick to it.  Cooking  Try to cook your favorite foods in a healthier way. For example, try baking instead of frying.  Use low-fat dairy products.  Meal planning  Use more fruits and vegetables. One-half of your plate should be fruits and vegetables.  Include lean proteins, such as chicken, turkey, and fish.  Lifestyle  Each week, aim to do one of the followin minutes of moderate exercise, such as walking.  75 minutes of vigorous exercise, such as running.  General information  Know how many calories are in the foods you eat most often. This will help you calculate calorie counts faster.  Find a way of tracking calories that works for you. Get creative. Try different apps or programs if writing down calories does not work for you.  What foods should I eat?    Eat nutritious foods. It is better to have a nutritious, high-calorie food, such as an avocado, than a food with few nutrients, such as a bag of potato chips.  Use your calories on foods and drinks that will fill you up and will not leave you hungry soon after eating.  Examples of foods that fill you up are nuts and nut butters, vegetables, lean proteins, and high-fiber foods such as whole grains. High-fiber foods are foods with more than 5 g of fiber per serving.  Pay attention to calories in drinks. Low-calorie drinks include water and unsweetened drinks.  The items listed above may not be a complete list of foods and beverages you can eat. Contact a dietitian for more information.  What foods should I limit?  Limit foods or drinks that are not good sources of vitamins, minerals, or protein or that are high in unhealthy fats.  These include:  Candy.  Other sweets.  Sodas, specialty coffee drinks, alcohol, and juice.  The items listed above may not be a complete list of foods and beverages you should avoid. Contact a dietitian for more information.  How do I count calories when eating out?  Pay attention to portions. Often, portions are much larger when eating out. Try these tips to keep portions smaller:  Consider sharing a meal instead of getting your own.  If you get your own meal, eat only half of it. Before you start eating, ask for a container and put half of your meal into it.  When available, consider ordering smaller portions from the menu instead of full portions.  Pay attention to your food and drink choices. Knowing the way food is cooked and what is included with the meal can help you eat fewer calories.  If calories are listed on the menu, choose the lower-calorie options.  Choose dishes that include vegetables, fruits, whole grains, low-fat dairy products, and lean proteins.  Choose items that are boiled, broiled, grilled, or steamed. Avoid items that are buttered, battered, fried, or served with cream sauce. Items labeled as crispy are usually fried, unless stated otherwise.  Choose water, low-fat milk, unsweetened iced tea, or other drinks without added sugar. If you want an alcoholic beverage, choose a lower-calorie option, such as a glass of wine or light beer.  Ask for dressings, sauces, and syrups on the side. These are usually high in calories, so you should limit the amount you eat.  If you want a salad, choose a garden salad and ask for grilled meats. Avoid extra toppings such as jarrett, cheese, or fried items. Ask for the dressing on the side, or ask for olive oil and vinegar or lemon to use as dressing.  Estimate how many servings of a food you are given. Knowing serving sizes will help you be aware of how much food you are eating at restaurants.  Where to find more information  Centers for Disease Control and  Prevention: www.cdc.gov  [place] Department of Agriculture: myplate.gov  Summary  Calorie counting means keeping track of how many calories you eat and drink each day. If you eat fewer calories than your body needs, you should lose weight.  A healthy amount of weight to lose per week is usually 1-2 lb (0.5-0.9 kg). This usually means reducing your daily calorie intake by 500-750 calories.  The number of calories in a food can be found on a Nutrition Facts label. If a food does not have a Nutrition Facts label, try to look up the calories online or ask your dietitian for help.  Use smaller plates, glasses, and bowls for smaller portions and to prevent overeating.  Use your calories on foods and drinks that will fill you up and not leave you hungry shortly after a meal.  This information is not intended to replace advice given to you by your health care provider. Make sure you discuss any questions you have with your health care provider.  Document Revised: 01/28/2021 Document Reviewed: 01/28/2021  [employer] Patient Education © 2022 [employer]  Earwax Buildup, Adult  The ears produce a substance called earwax that helps keep bacteria out of the ear and protects the skin in the ear canal. Occasionally, earwax can build up in the ear and cause discomfort or hearing loss.  What are the causes?  This condition is caused by a buildup of earwax. Ear canals are self-cleaning. Ear wax is made in the outer part of the ear canal and generally falls out in small amounts over time.  When the self-cleaning mechanism is not working, earwax builds up and can cause decreased hearing and discomfort. Attempting to clean ears with cotton swabs can push the earwax deep into the ear canal and cause decreased hearing and pain.  What increases the risk?  This condition is more likely to develop in people who:  Clean their ears often with cotton swabs.  Pick at their ears.  Use earplugs or in-ear headphones often, or wear hearing aids.  The  following factors may also make you more likely to develop this condition:  Being male.  Being of older age.  Naturally producing more earwax.  Having narrow ear canals.  Having earwax that is overly thick or sticky.  Having excess hair in the ear canal.  Having eczema.  Being dehydrated.  What are the signs or symptoms?  Symptoms of this condition include:  Reduced or muffled hearing.  A feeling of fullness in the ear or feeling that the ear is plugged.  Fluid coming from the ear.  Ear pain or an itchy ear.  Ringing in the ear.  Coughing.  Balance problems.  An obvious piece of earwax that can be seen inside the ear canal.  How is this diagnosed?  This condition may be diagnosed based on:  Your symptoms.  Your medical history.  An ear exam. During the exam, your health care provider will look into your ear with an instrument called an otoscope.  You may have tests, including a hearing test.  How is this treated?  This condition may be treated by:  Using ear drops to soften the earwax.  Having the earwax removed by a health care provider. The health care provider may:  Flush the ear with water.  Use an instrument that has a loop on the end (curette).  Use a suction device.  Having surgery to remove the wax buildup. This may be done in severe cases.  Follow these instructions at home:    Take over-the-counter and prescription medicines only as told by your health care provider.  Do not put any objects, including cotton swabs, into your ear. You can clean the opening of your ear canal with a washcloth or facial tissue.  Follow instructions from your health care provider about cleaning your ears. Do not overclean your ears.  Drink enough fluid to keep your urine pale yellow. This will help to thin the earwax.  Keep all follow-up visits as told. If earwax builds up in your ears often or if you use hearing aids, consider seeing your health care provider for routine, preventive ear cleanings. Ask your health care provider  how often you should schedule your cleanings.  If you have hearing aids, clean them according to instructions from the  and your health care provider.  Contact a health care provider if:  You have ear pain.  You develop a fever.  You have pus or other fluid coming from your ear.  You have hearing loss.  You have ringing in your ears that does not go away.  You feel like the room is spinning (vertigo).  Your symptoms do not improve with treatment.  Get help right away if:  You have bleeding from the affected ear.  You have severe ear pain.  Summary  Earwax can build up in the ear and cause discomfort or hearing loss.  The most common symptoms of this condition include reduced or muffled hearing, a feeling of fullness in the ear, or feeling that the ear is plugged.  This condition may be diagnosed based on your symptoms, your medical history, and an ear exam.  This condition may be treated by using ear drops to soften the earwax or by having the earwax removed by a health care provider.  Do not put any objects, including cotton swabs, into your ear. You can clean the opening of your ear canal with a washcloth or facial tissue.  This information is not intended to replace advice given to you by your health care provider. Make sure you discuss any questions you have with your health care provider.  Document Revised: 04/06/2021 Document Reviewed: 04/06/2021  Elsevier Patient Education © 2022 Elsevier Inc.

## 2022-09-22 ENCOUNTER — TRANSCRIBE ORDERS (OUTPATIENT)
Dept: ADMINISTRATIVE | Facility: HOSPITAL | Age: 67
End: 2022-09-22

## 2022-09-22 DIAGNOSIS — Z12.31 SCREENING MAMMOGRAM FOR BREAST CANCER: Primary | ICD-10-CM

## 2022-10-08 DIAGNOSIS — F33.0 MILD RECURRENT MAJOR DEPRESSION: ICD-10-CM

## 2022-10-10 RX ORDER — FLUOXETINE HYDROCHLORIDE 40 MG/1
40 CAPSULE ORAL DAILY
Qty: 90 CAPSULE | Refills: 1 | Status: SHIPPED | OUTPATIENT
Start: 2022-10-10

## 2022-10-17 RX ORDER — METFORMIN HYDROCHLORIDE 500 MG/1
500 TABLET, EXTENDED RELEASE ORAL
Qty: 90 TABLET | Refills: 1 | Status: SHIPPED | OUTPATIENT
Start: 2022-10-17

## 2022-10-25 DIAGNOSIS — I10 ESSENTIAL HYPERTENSION: Chronic | ICD-10-CM

## 2022-10-25 DIAGNOSIS — E78.5 HYPERLIPIDEMIA, UNSPECIFIED HYPERLIPIDEMIA TYPE: Chronic | ICD-10-CM

## 2022-10-25 RX ORDER — ATORVASTATIN CALCIUM 10 MG/1
10 TABLET, FILM COATED ORAL NIGHTLY
Qty: 90 TABLET | Refills: 1 | Status: SHIPPED | OUTPATIENT
Start: 2022-10-25

## 2022-10-25 RX ORDER — LISINOPRIL 10 MG/1
10 TABLET ORAL DAILY
Qty: 90 TABLET | Refills: 1 | Status: SHIPPED | OUTPATIENT
Start: 2022-10-25

## 2022-10-28 DIAGNOSIS — K21.9 GASTRO-ESOPHAGEAL REFLUX DISEASE WITHOUT ESOPHAGITIS: ICD-10-CM

## 2022-10-28 RX ORDER — ESOMEPRAZOLE MAGNESIUM 40 MG/1
40 CAPSULE, DELAYED RELEASE ORAL
Qty: 90 CAPSULE | Refills: 1 | Status: SHIPPED | OUTPATIENT
Start: 2022-10-28

## 2022-11-10 ENCOUNTER — OFFICE VISIT (OUTPATIENT)
Dept: NEUROSURGERY | Facility: CLINIC | Age: 67
End: 2022-11-10

## 2022-11-10 VITALS
BODY MASS INDEX: 36.39 KG/M2 | HEART RATE: 69 BPM | WEIGHT: 232.31 LBS | TEMPERATURE: 98.1 F | SYSTOLIC BLOOD PRESSURE: 104 MMHG | DIASTOLIC BLOOD PRESSURE: 65 MMHG | OXYGEN SATURATION: 95 %

## 2022-11-10 DIAGNOSIS — G89.29 CHRONIC MIDLINE LOW BACK PAIN WITHOUT SCIATICA: ICD-10-CM

## 2022-11-10 DIAGNOSIS — M54.50 CHRONIC MIDLINE LOW BACK PAIN WITHOUT SCIATICA: ICD-10-CM

## 2022-11-10 DIAGNOSIS — M51.36 DDD (DEGENERATIVE DISC DISEASE), LUMBAR: Primary | ICD-10-CM

## 2022-11-10 PROCEDURE — 99212 OFFICE O/P EST SF 10 MIN: CPT | Performed by: NEUROLOGICAL SURGERY

## 2022-11-10 RX ORDER — TRAZODONE HYDROCHLORIDE 50 MG/1
50 TABLET ORAL
COMMUNITY
Start: 2022-10-20

## 2022-11-10 RX ORDER — PHENTERMINE HYDROCHLORIDE 37.5 MG/1
18.75 TABLET ORAL EVERY MORNING
COMMUNITY
Start: 2022-10-20

## 2022-11-10 RX ORDER — PHENAZOPYRIDINE HYDROCHLORIDE 100 MG/1
TABLET, FILM COATED ORAL
COMMUNITY
Start: 2022-11-07 | End: 2022-12-07

## 2022-11-10 RX ORDER — NITROFURANTOIN 25; 75 MG/1; MG/1
100 CAPSULE ORAL 2 TIMES DAILY
COMMUNITY
Start: 2022-11-07 | End: 2022-12-07

## 2022-11-10 RX ORDER — BUPROPION HYDROCHLORIDE 150 MG/1
150 TABLET ORAL EVERY MORNING
COMMUNITY
Start: 2022-10-20

## 2022-11-10 NOTE — PROGRESS NOTES
Zahira Perea is a 67 y.o. female that presents with lumbar  (Follow up lumbar mri at [facility] 3/19/22 on pacs )       She has continued lower back pain. She has lost some weight and is on Welbutrin and has lost 18 lbs. She is on Metformin and Phentermine. She has also joined [employer] and is working on her core strength.       Review of Systems   Musculoskeletal: Positive for back pain.        Vitals:    11/10/22 1116   BP: 104/65   Pulse: 69   Temp: 98.1 °F (36.7 °C)   SpO2: 95%        Physical Exam  Constitutional:       Appearance: She is obese.   Neurological:      Mental Status: She is alert.   Psychiatric:         Mood and Affect: Mood normal.           Assessment and Plan {CC Problem List  Visit Diagnosis  ROS  Review (Popup)  Health Maintenance  Quality  BestPractice  Medications  SmartSets  SnapShot Encounters  Media :23}   Problem List Items Addressed This Visit    None  Visit Diagnoses     DDD (degenerative disc disease), lumbar    -  Primary    Chronic midline low back pain without sciatica          She will continue to work on nicotine cessation and weight loss. She will continue to work on core strengthening.     Follow Up {Instructions Charge Capture  Follow-up Communications :23}   Return if symptoms worsen or fail to improve.

## 2022-12-06 NOTE — PROGRESS NOTES
Chief Complaint  Follow-up (67 year old female here today to follow up on a bladder infection. States she has some States she is still taking Amoxicillin. States the burning is still there. /States she has a cough and congestion but is getting some better. )  Subjective        History of Present Illness  Zahira Perea is a 67 y.o. female who presents to [facility] for follow-up on urinary tract infection and URI. Seen in [facility] clinic.   Positive for hematuria. Used Macrobid and pyridium and has improved and is now taking amoxicillin clavunate and AZO for about 5 days. Startting to have dysuria again with burning. She has chronic urinary incontinence. Chest feels congestion and cough is productive with yellow sputum. She reports that she can't lay flat. She stopped mucinex.    Review of Systems  Constitutional: Negative for fever and chills.   HEENT: Negative for sinus pain, earache, and sore throat.   Cardiovascular: Negative for chest pain.   Respiratory: Negative for dyspnea.  Positive for cough.  Gastrointestinal: Negative for nausea and vomiting.  Musculoskeletal: Positive for chronic backache  Neurologic: Negative for headaches and dizziness.  Genitourinary: Negative for vaginal discharge, odor, and itching.   Reproductive: History of hysterectomy.        Past Medical History:   Diagnosis Date   • Arthritis 2018   • Cervical disc disorder 2020   • Depression    • Disorder of white blood cells    • Essential (primary) hypertension    • Gastro-esophageal reflux disease without esophagitis    • Hypercholesterolemia    • Hyperlipidemia, unspecified    • Low back pain 2020   • Low blood sugar    • Lumbosacral disc disease 2020   • Major depressive disorder, recurrent (HCC)    • Stage 3 chronic kidney disease (HCC)    • Vitamin D deficiency         Past Surgical History:   Procedure Laterality Date   • COLONOSCOPY  09/2019   • EPIDURAL BLOCK  2021   • HYSTERECTOMY       "    Allergies   Allergen Reactions   • Promethazine Other (See Comments)     Family history of hallucinations when using it.          Current Outpatient Medications:   •  atorvastatin (LIPITOR) 10 MG tablet, TAKE 1 TABLET BY MOUTH EVERY NIGHT, Disp: 90 tablet, Rfl: 1  •  buPROPion XL (WELLBUTRIN XL) 150 MG 24 hr tablet, Take 1 tablet by mouth Every Morning., Disp: , Rfl:   •  cholecalciferol (VITAMIN D3) 25 MCG (1000 UT) tablet, Take 2,000 Units by mouth Daily., Disp: , Rfl:   •  esomeprazole (nexIUM) 40 MG capsule, Take 1 capsule by mouth Every Morning Before Breakfast., Disp: 90 capsule, Rfl: 1  •  FLUoxetine (PROzac) 40 MG capsule, TAKE 1 CAPSULE BY MOUTH DAILY, Disp: 90 capsule, Rfl: 1  •  gabapentin (NEURONTIN) 300 MG capsule, gabapentin 300 mg capsule  Take 1 capsule 3 times a day by oral route as directed for 30 days., Disp: , Rfl:   •  HYDROcodone-acetaminophen (NORCO) 5-325 MG per tablet, Take 1 tablet by mouth Every 6 (Six) Hours As Needed., Disp: , Rfl:   •  lisinopril (PRINIVIL,ZESTRIL) 10 MG tablet, TAKE 1 TABLET BY MOUTH DAILY, Disp: 90 tablet, Rfl: 1  •  metFORMIN ER (Glucophage XR) 500 MG 24 hr tablet, Take 1 tablet by mouth Daily With Breakfast., Disp: 90 tablet, Rfl: 1  •  phentermine (ADIPEX-P) 37.5 MG tablet, Take 0.5 tablets by mouth Every Morning., Disp: , Rfl:   •  topiramate (TOPAMAX) 25 MG tablet, topiramate 25 mg tablet, Disp: , Rfl:   •  traZODone (DESYREL) 50 MG tablet, Take 1 tablet by mouth every night at bedtime., Disp: , Rfl:   •  sulfamethoxazole-trimethoprim (Bactrim DS) 800-160 MG per tablet, Take 1 tablet by mouth 2 (Two) Times a Day for 7 days., Disp: 14 tablet, Rfl: 0    Objective   /70 (BP Location: Left arm, Patient Position: Sitting)   Pulse 84   Temp 98.6 °F (37 °C) (Temporal)   Resp 18   Ht 170.2 cm (67\")   Wt 102 kg (225 lb 6.4 oz)   SpO2 97%   BMI 35.30 kg/m²    Estimated body mass index is 35.3 kg/m² as calculated from the following:    Height as of this " "encounter: 170.2 cm (67\").    Weight as of this encounter: 102 kg (225 lb 6.4 oz).   Physical Exam  Vitals reviewed.   Constitutional:       General: She is not in acute distress.  HENT:      Head: Normocephalic and atraumatic.   Eyes:      Conjunctiva/sclera: Conjunctivae normal.   Cardiovascular:      Rate and Rhythm: Normal rate and regular rhythm.      Heart sounds: Normal heart sounds.   Pulmonary:      Effort: Pulmonary effort is normal.      Breath sounds: Examination of the right-upper field reveals rhonchi. Examination of the left-upper field reveals rhonchi. Examination of the right-middle field reveals rhonchi. Rhonchi present. No wheezing or rales.   Abdominal:      General: There is no distension.      Palpations: Abdomen is soft. There is no mass.      Tenderness: There is abdominal tenderness in the right lower quadrant, suprapubic area and left lower quadrant. There is no right CVA tenderness or left CVA tenderness.   Lymphadenopathy:      Cervical: No cervical adenopathy.   Skin:     General: Skin is warm and dry.   Neurological:      General: No focal deficit present.      Mental Status: She is alert.      Motor: No weakness.   Psychiatric:         Mood and Affect: Mood normal.         Thought Content: Thought content normal.        Result Review :                   Assessment and Plan    Diagnoses and all orders for this visit:    1. Acute bronchitis, unspecified organism (Primary)    2. Acute cystitis with hematuria    3. Urinary incontinence, unspecified type  -     Ambulatory Referral to Urology    Other orders  -     sulfamethoxazole-trimethoprim (Bactrim DS) 800-160 MG per tablet; Take 1 tablet by mouth 2 (Two) Times a Day for 7 days.  Dispense: 14 tablet; Refill: 0    Recommend restarting mucinex DM for cough and congestion.  The patient has been on Augmentin for 5 days with minimal improvement.  Stop Augmentin and start Bactrim DS 1 tablet twice daily for 7 days for urinary tract infection " symptoms.  Patient was told if she does not improve with her UTI and bronchitis to follow-up.  I have placed a referral for urology for issues with urinary incontinence.    Follow Up     Patient was given instructions and counseling regarding her condition. Please see specific information pulled into the AVS if appropriate.   No follow-ups on file.    SILVANA Rodriguez

## 2022-12-07 ENCOUNTER — OFFICE VISIT (OUTPATIENT)
Dept: INTERNAL MEDICINE | Facility: CLINIC | Age: 67
End: 2022-12-07

## 2022-12-07 VITALS
SYSTOLIC BLOOD PRESSURE: 105 MMHG | TEMPERATURE: 98.6 F | DIASTOLIC BLOOD PRESSURE: 70 MMHG | HEART RATE: 84 BPM | HEIGHT: 67 IN | RESPIRATION RATE: 18 BRPM | OXYGEN SATURATION: 97 % | BODY MASS INDEX: 35.38 KG/M2 | WEIGHT: 225.4 LBS

## 2022-12-07 DIAGNOSIS — N30.01 ACUTE CYSTITIS WITH HEMATURIA: ICD-10-CM

## 2022-12-07 DIAGNOSIS — R32 URINARY INCONTINENCE, UNSPECIFIED TYPE: ICD-10-CM

## 2022-12-07 DIAGNOSIS — J20.9 ACUTE BRONCHITIS, UNSPECIFIED ORGANISM: Primary | ICD-10-CM

## 2022-12-07 PROCEDURE — 99214 OFFICE O/P EST MOD 30 MIN: CPT | Performed by: NURSE PRACTITIONER

## 2022-12-07 RX ORDER — SULFAMETHOXAZOLE AND TRIMETHOPRIM 800; 160 MG/1; MG/1
1 TABLET ORAL 2 TIMES DAILY
Qty: 14 TABLET | Refills: 0 | Status: SHIPPED | OUTPATIENT
Start: 2022-12-07 | End: 2022-12-14

## 2022-12-16 ENCOUNTER — HOSPITAL ENCOUNTER (OUTPATIENT)
Dept: BONE DENSITY | Facility: HOSPITAL | Age: 67
Discharge: HOME | End: 2022-12-16

## 2022-12-16 ENCOUNTER — HOSPITAL ENCOUNTER (OUTPATIENT)
Dept: MAMMOGRAPHY | Facility: HOSPITAL | Age: 67
Discharge: HOME | End: 2022-12-16

## 2022-12-16 DIAGNOSIS — Z78.0 POST-MENOPAUSAL: ICD-10-CM

## 2022-12-16 DIAGNOSIS — Z12.31 SCREENING MAMMOGRAM FOR BREAST CANCER: ICD-10-CM

## 2022-12-16 PROCEDURE — 77067 SCR MAMMO BI INCL CAD: CPT

## 2022-12-16 PROCEDURE — 77080 DXA BONE DENSITY AXIAL: CPT

## 2022-12-16 PROCEDURE — 77063 BREAST TOMOSYNTHESIS BI: CPT

## 2023-01-30 ENCOUNTER — TELEPHONE (OUTPATIENT)
Dept: UROLOGY | Facility: CLINIC | Age: 68
End: 2023-01-30
Payer: COMMERCIAL

## 2023-01-30 NOTE — TELEPHONE ENCOUNTER
Hub staff attempted to follow warm transfer process and was unsuccessful     Caller: Zahira Perea    Relationship to patient: Self    Best call back number: [phone]; ANYTIME IS GOOD    Patient is needing: RECEIVED A CALL FROM PT. SHE MISSED A CALL FROM JAYLON AND WAS JUST RETURNING HER CALL. OFFICE PLEASE ADVISE. THANK YOU.

## 2023-02-03 ENCOUNTER — TELEPHONE (OUTPATIENT)
Dept: INTERNAL MEDICINE | Facility: CLINIC | Age: 68
End: 2023-02-03

## 2023-02-03 NOTE — TELEPHONE ENCOUNTER
Caller: Zahira Perea    Relationship: Self    Best call back number: [phone]    What medication are you requesting: BLADDER INFECTION MEDICATION    What are your current symptoms: FREQUENCY OF URINATION, PAIN, PRESSURE    How long have you been experiencing symptoms:  4 DAYS    Have you had these symptoms before:    [x] Yes  [] No    Have you been treated for these symptoms before:   [x] Yes  [] No    If a prescription is needed, what is your preferred pharmacy and phone number:  [id number] [address] AT [facility] [phone] [facility] [phone]      Additional notes:PATIENT CALLED IN AND SAID THAT SHE HAS ANOTHER BLADDER INFECTION AND IS ASKING FOR MEDICATION

## 2023-02-06 NOTE — PROGRESS NOTES
Chief Complaint  Urinary Tract Infection (Patient complains of burning, pain, urgency last Monday. She took azo but was still having those symptoms. )    Subjective    History of Present Illness:  Zahira Perea presents to [facility] for complaint of dysuria and urinary frequency for over one week. Positive for hematuria/. She is taking AZO.     Review of Systems  Constitutional: Negative for fever and chills.   Cardiovascular: Negative for chest pain and peripheral edema.   Respiratory: Negative for cough and dyspnea.   Gastrointestinal: Negative for nausea, vomiting and diarrhea.  Positive for constipation.   Musculoskeletal: Negative for acute back pain.   Genitourinary: Negative for vaginal discharge, odor and itching.     Past Medical History:   Diagnosis Date   • Arthritis 2018   • Cervical disc disorder 2020   • Depression    • Disorder of white blood cells    • Essential (primary) hypertension    • Gastro-esophageal reflux disease without esophagitis    • Hypercholesterolemia    • Hyperlipidemia, unspecified    • Low back pain 2020   • Low blood sugar    • Lumbosacral disc disease 2020   • Major depressive disorder, recurrent (HCC)    • Stage 3 chronic kidney disease (HCC)    • Vitamin D deficiency         Past Surgical History:   Procedure Laterality Date   • COLONOSCOPY  09/2019   • EPIDURAL BLOCK  2021   • HYSTERECTOMY          Allergies   Allergen Reactions   • Promethazine Other (See Comments)     Family history of hallucinations when using it.          Current Outpatient Medications:   •  atorvastatin (LIPITOR) 10 MG tablet, TAKE 1 TABLET BY MOUTH EVERY NIGHT, Disp: 90 tablet, Rfl: 1  •  buPROPion XL (WELLBUTRIN XL) 150 MG 24 hr tablet, Take 1 tablet by mouth Every Morning., Disp: , Rfl:   •  cholecalciferol (VITAMIN D3) 25 MCG (1000 UT) tablet, Take 2,000 Units by mouth Daily., Disp: , Rfl:   •  esomeprazole (nexIUM) 40 MG capsule, Take 1 capsule by mouth Every  "Morning Before Breakfast., Disp: 90 capsule, Rfl: 1  •  FLUoxetine (PROzac) 40 MG capsule, TAKE 1 CAPSULE BY MOUTH DAILY, Disp: 90 capsule, Rfl: 1  •  gabapentin (NEURONTIN) 300 MG capsule, gabapentin 300 mg capsule  Take 1 capsule 3 times a day by oral route as directed for 30 days., Disp: , Rfl:   •  HYDROcodone-acetaminophen (NORCO) 5-325 MG per tablet, Take 1 tablet by mouth Every 6 (Six) Hours As Needed., Disp: , Rfl:   •  lisinopril (PRINIVIL,ZESTRIL) 10 MG tablet, TAKE 1 TABLET BY MOUTH DAILY, Disp: 90 tablet, Rfl: 1  •  metFORMIN ER (Glucophage XR) 500 MG 24 hr tablet, Take 1 tablet by mouth Daily With Breakfast., Disp: 90 tablet, Rfl: 1  •  phentermine (ADIPEX-P) 37.5 MG tablet, Take 0.5 tablets by mouth Every Morning., Disp: , Rfl:   •  topiramate (TOPAMAX) 25 MG tablet, topiramate 25 mg tablet, Disp: , Rfl:   •  traZODone (DESYREL) 50 MG tablet, Take 1 tablet by mouth every night at bedtime., Disp: , Rfl:   •  ciprofloxacin (Cipro) 500 MG tablet, Take 1 tablet by mouth 2 (Two) Times a Day for 7 days., Disp: 14 tablet, Rfl: 0  •  phenazopyridine (Pyridium) 200 MG tablet, Take 1 tablet by mouth 3 (Three) Times a Day As Needed for Bladder Spasms., Disp: 21 tablet, Rfl: 0    Objective   Vital Signs:   /70 (BP Location: Left arm, Patient Position: Sitting, Cuff Size: Large Adult)   Pulse 65   Temp 97.2 °F (36.2 °C) (Temporal)   Ht 170.2 cm (67\")   Wt 102 kg (225 lb)   SpO2 94%   BMI 35.24 kg/m²       Physical Exam  Vitals reviewed.   Constitutional:       General: She is not in acute distress.  HENT:      Head: Normocephalic and atraumatic.   Pulmonary:      Effort: Pulmonary effort is normal.   Abdominal:      General: There is no distension.      Palpations: Abdomen is soft. There is no mass.      Tenderness: There is no abdominal tenderness. There is no right CVA tenderness or left CVA tenderness.   Skin:     General: Skin is warm and dry.   Neurological:      General: No focal deficit present. "      Mental Status: She is alert.      Motor: No weakness.   Psychiatric:         Mood and Affect: Mood normal.         Thought Content: Thought content normal.             Assessment and Plan:  Diagnoses and all orders for this visit:    1. Acute cystitis with hematuria (Primary)  -     Urine Culture - Urine, Urine, Clean Catch; Future    Other orders  -     ciprofloxacin (Cipro) 500 MG tablet; Take 1 tablet by mouth 2 (Two) Times a Day for 7 days.  Dispense: 14 tablet; Refill: 0  -     phenazopyridine (Pyridium) 200 MG tablet; Take 1 tablet by mouth 3 (Three) Times a Day As Needed for Bladder Spasms.  Dispense: 21 tablet; Refill: 0      POCT: Urinalysis positive leukocytes. Urine sent for culture.     Increase fluid intake.  Education on diagnosis, medication and treatment plan.        Patient was given instructions and counseling regarding condition.     Follow Up:  No follow-ups on file.

## 2023-02-07 ENCOUNTER — OFFICE VISIT (OUTPATIENT)
Dept: INTERNAL MEDICINE | Facility: CLINIC | Age: 68
End: 2023-02-07
Payer: COMMERCIAL

## 2023-02-07 VITALS
SYSTOLIC BLOOD PRESSURE: 100 MMHG | WEIGHT: 225 LBS | DIASTOLIC BLOOD PRESSURE: 70 MMHG | HEART RATE: 65 BPM | OXYGEN SATURATION: 94 % | HEIGHT: 67 IN | BODY MASS INDEX: 35.31 KG/M2 | TEMPERATURE: 97.2 F

## 2023-02-07 DIAGNOSIS — N30.01 ACUTE CYSTITIS WITH HEMATURIA: Primary | ICD-10-CM

## 2023-02-07 LAB
BILIRUB BLD-MCNC: NEGATIVE MG/DL
CLARITY, POC: CLEAR
COLOR UR: YELLOW
EXPIRATION DATE: ABNORMAL
GLUCOSE UR STRIP-MCNC: NEGATIVE MG/DL
KETONES UR QL: NEGATIVE
LEUKOCYTE EST, POC: ABNORMAL
Lab: ABNORMAL
NITRITE UR-MCNC: NEGATIVE MG/ML
PH UR: 7 [PH] (ref 5–8)
PROT UR STRIP-MCNC: NEGATIVE MG/DL
RBC # UR STRIP: NEGATIVE /UL
SP GR UR: 1.01 (ref 1–1.03)
UROBILINOGEN UR QL: ABNORMAL

## 2023-02-07 PROCEDURE — 99213 OFFICE O/P EST LOW 20 MIN: CPT | Performed by: NURSE PRACTITIONER

## 2023-02-07 PROCEDURE — 87186 SC STD MICRODIL/AGAR DIL: CPT | Performed by: NURSE PRACTITIONER

## 2023-02-07 PROCEDURE — 87077 CULTURE AEROBIC IDENTIFY: CPT | Performed by: NURSE PRACTITIONER

## 2023-02-07 PROCEDURE — 81003 URINALYSIS AUTO W/O SCOPE: CPT | Performed by: NURSE PRACTITIONER

## 2023-02-07 PROCEDURE — 87086 URINE CULTURE/COLONY COUNT: CPT | Performed by: NURSE PRACTITIONER

## 2023-02-07 RX ORDER — PHENAZOPYRIDINE HYDROCHLORIDE 200 MG/1
200 TABLET, FILM COATED ORAL 3 TIMES DAILY PRN
Qty: 21 TABLET | Refills: 0 | Status: SHIPPED | OUTPATIENT
Start: 2023-02-07

## 2023-02-07 RX ORDER — CIPROFLOXACIN 500 MG/1
500 TABLET, FILM COATED ORAL 2 TIMES DAILY
Qty: 14 TABLET | Refills: 0 | Status: SHIPPED | OUTPATIENT
Start: 2023-02-07 | End: 2023-02-14

## 2023-02-09 LAB — BACTERIA SPEC AEROBE CULT: ABNORMAL

## 2023-02-23 ENCOUNTER — OFFICE VISIT (OUTPATIENT)
Dept: UROLOGY | Facility: CLINIC | Age: 68
End: 2023-02-23
Payer: COMMERCIAL

## 2023-02-23 VITALS — WEIGHT: 212.4 LBS | HEIGHT: 67 IN | BODY MASS INDEX: 33.34 KG/M2

## 2023-02-23 DIAGNOSIS — N30.01 ACUTE CYSTITIS WITH HEMATURIA: Primary | ICD-10-CM

## 2023-02-23 DIAGNOSIS — R31.0 GROSS HEMATURIA: ICD-10-CM

## 2023-02-23 DIAGNOSIS — N95.2 VAGINAL ATROPHY: ICD-10-CM

## 2023-02-23 LAB
BILIRUB BLD-MCNC: NEGATIVE MG/DL
CLARITY, POC: CLEAR
COLOR UR: YELLOW
EXPIRATION DATE: NORMAL
GLUCOSE UR STRIP-MCNC: NEGATIVE MG/DL
KETONES UR QL: NEGATIVE
LEUKOCYTE EST, POC: NEGATIVE
Lab: NORMAL
NITRITE UR-MCNC: NEGATIVE MG/ML
PH UR: 6 [PH] (ref 5–8)
PROT UR STRIP-MCNC: NEGATIVE MG/DL
RBC # UR STRIP: NEGATIVE /UL
SP GR UR: 1.02 (ref 1–1.03)
URINE VOLUME: NORMAL
UROBILINOGEN UR QL: NORMAL

## 2023-02-23 PROCEDURE — 81003 URINALYSIS AUTO W/O SCOPE: CPT | Performed by: NURSE PRACTITIONER

## 2023-02-23 PROCEDURE — 99214 OFFICE O/P EST MOD 30 MIN: CPT | Performed by: NURSE PRACTITIONER

## 2023-02-23 PROCEDURE — 51798 US URINE CAPACITY MEASURE: CPT | Performed by: NURSE PRACTITIONER

## 2023-02-23 RX ORDER — ESTRADIOL 0.1 MG/G
1 CREAM VAGINAL 2 TIMES WEEKLY
Qty: 42.5 G | Refills: 6 | Status: SHIPPED | OUTPATIENT
Start: 2023-02-23 | End: 2023-05-24

## 2023-02-23 NOTE — PROGRESS NOTES
"Chief Complaint: Urinary Incontinence    Subjective         History of Present Illness  Zahira Perea is a 67 y.o. female presents to [facility] to be seen for incontinence.    Patient presents reporting she developed a \"bladder infection\"- she was seen at [facility] in [place]. She reports there was blood in her urine at that time. It was sent for culture. She reports she had a small amount of improvement, so went back within 2 weeks. She continued to have blood in her urine. She reports no bacteria grew. She was advised to see urology for this. No records available for review.     She was previously seen by Dr. De Leon for similar symptoms. She reports they did a work up to include a CT scan and cystoscopy but she is unsure what year this was done. No records available for review    She reports every time she has had a UTI it typically grows E coli.     She does reports she tends to be constipation.      Frequency- every     Urgency- with incontinence at times    Nocturia: 2-3 per night    GH- clots, last saw blood in December    Incontinence with urgency    Smoker- vape     surgery- denies    Family hx of  malignancy- denies    Anticoagulant- denies      Urine culture  2/7/2023 50,000 colony-forming units per mL of E. coli pansensitive    Objective     Past Medical History:   Diagnosis Date   • Arthritis 2018   • Cervical disc disorder 2020   • Depression    • Disorder of white blood cells    • Essential (primary) hypertension    • Gastro-esophageal reflux disease without esophagitis    • Hypercholesterolemia    • Hyperlipidemia, unspecified    • Low back pain 2020   • Low blood sugar    • Lumbosacral disc disease 2020   • Major depressive disorder, recurrent (HCC)    • Stage 3 chronic kidney disease (HCC)    • Vitamin D deficiency        Past Surgical History:   Procedure Laterality Date   • COLONOSCOPY  09/2019   • EPIDURAL BLOCK  2021   • HYSTERECTOMY           Current " Outpatient Medications:   •  atorvastatin (LIPITOR) 10 MG tablet, TAKE 1 TABLET BY MOUTH EVERY NIGHT, Disp: 90 tablet, Rfl: 1  •  buPROPion XL (WELLBUTRIN XL) 150 MG 24 hr tablet, Take 1 tablet by mouth Every Morning., Disp: , Rfl:   •  cholecalciferol (VITAMIN D3) 25 MCG (1000 UT) tablet, Take 2,000 Units by mouth Daily., Disp: , Rfl:   •  esomeprazole (nexIUM) 40 MG capsule, Take 1 capsule by mouth Every Morning Before Breakfast., Disp: 90 capsule, Rfl: 1  •  FLUoxetine (PROzac) 40 MG capsule, TAKE 1 CAPSULE BY MOUTH DAILY, Disp: 90 capsule, Rfl: 1  •  gabapentin (NEURONTIN) 300 MG capsule, gabapentin 300 mg capsule  Take 1 capsule 3 times a day by oral route as directed for 30 days., Disp: , Rfl:   •  HYDROcodone-acetaminophen (NORCO) 5-325 MG per tablet, Take 1 tablet by mouth Every 6 (Six) Hours As Needed., Disp: , Rfl:   •  lisinopril (PRINIVIL,ZESTRIL) 10 MG tablet, TAKE 1 TABLET BY MOUTH DAILY, Disp: 90 tablet, Rfl: 1  •  metFORMIN ER (Glucophage XR) 500 MG 24 hr tablet, Take 1 tablet by mouth Daily With Breakfast., Disp: 90 tablet, Rfl: 1  •  phenazopyridine (Pyridium) 200 MG tablet, Take 1 tablet by mouth 3 (Three) Times a Day As Needed for Bladder Spasms., Disp: 21 tablet, Rfl: 0  •  phentermine (ADIPEX-P) 37.5 MG tablet, Take 0.5 tablets by mouth Every Morning., Disp: , Rfl:   •  topiramate (TOPAMAX) 25 MG tablet, topiramate 25 mg tablet, Disp: , Rfl:   •  traZODone (DESYREL) 50 MG tablet, Take 1 tablet by mouth every night at bedtime., Disp: , Rfl:   •  estradiol (ESTRACE) 0.1 MG/GM vaginal cream, Insert 1 g into the vagina 2 (Two) Times a Week for 90 days., Disp: 42.5 g, Rfl: 6    Allergies   Allergen Reactions   • Promethazine Other (See Comments)     Family history of hallucinations when using it.        Family History   Problem Relation Age of Onset   • Arthritis Mother    • Stroke Mother    • Cancer Father    • Hyperlipidemia Father    • Vision loss Father    • Diabetes Sister    • Thyroid disease  "Sister    • Heart disease Brother         HEART CONDITION        Social History     Socioeconomic History   • Marital status:    Tobacco Use   • Smoking status: Former     Packs/day: 0.50     Years: 30.00     Pack years: 15.00     Types: Electronic Cigarette, Cigarettes     Start date:      Quit date:      Years since quittin.1   • Smokeless tobacco: Never   Vaping Use   • Vaping Use: Every day   Substance and Sexual Activity   • Alcohol use: Not Currently   • Drug use: Never   • Sexual activity: Yes     Partners: Male     Birth control/protection: Surgical, Post-menopausal       Vital Signs:   Ht 170.2 cm (67\")   Wt 96.3 kg (212 lb 6.4 oz)   BMI 33.27 kg/m²      Physical Exam  Vitals reviewed.   Constitutional:       Appearance: Normal appearance.   Neurological:      General: No focal deficit present.      Mental Status: She is alert.          Result Review :   The following data was reviewed by: SILVANA Gong on 2023:  Results for orders placed or performed in visit on 23   Bladder Scan   Result Value Ref Range    Urine Volume 0ml    POC Urinalysis Dipstick, Automated    Specimen: Urine   Result Value Ref Range    Color Yellow Yellow, Straw, Dark Yellow, Bozena    Clarity, UA Clear Clear    Specific Gravity  1.020 1.005 - 1.030    pH, Urine 6.0 5.0 - 8.0    Leukocytes Negative Negative    Nitrite, UA Negative Negative    Protein, POC Negative Negative mg/dL    Glucose, UA Negative Negative mg/dL    Ketones, UA Negative Negative    Urobilinogen, UA 0.2 E.U./dL Normal, 0.2 E.U./dL    Bilirubin Negative Negative    Blood, UA Negative Negative    Lot Number [id number]     Expiration Date          Bladder Scan interpretation 2023    Estimation of residual urine via BVI 3000 Verathon Bladder Scan  MA/nurse performing: Maricarmen CARR MA  Residual Urine: 0 ml  Indication: Acute cystitis with hematuria    Vaginal atrophy    Gross hematuria   Position: Supine  Examination: " Incremental scanning of the suprapubic area using 2.0 MHz transducer using copious amounts of acoustic gel.   Findings: An anechoic area was demonstrated which represented the bladder, with measurement of residual urine as noted. I inspected this myself. In that the residual urine was  insignificant, refer to plan for treatment and plan necessary at this time.           Procedures        Assessment and Plan    Diagnoses and all orders for this visit:    1. Acute cystitis with hematuria (Primary)  -     POC Urinalysis Dipstick, Automated  -     Bladder Scan    2. Vaginal atrophy  -     estradiol (ESTRACE) 0.1 MG/GM vaginal cream; Insert 1 g into the vagina 2 (Two) Times a Week for 90 days.  Dispense: 42.5 g; Refill: 6    3. Gross hematuria  -     Cancel: CT Abdomen Pelvis With & Without Contrast; Future  -     Cystoscopy; Future  -     CT Abdomen Pelvis With & Without Contrast; Future         67-year-old lady presents for further evaluation of hematuria.  Notes history of 1 day of gross, painless hematuria with clot in December which resolved spontaneously. Previously had work up for hematuria but no records of this available. Will go ahead with work up of hematuria.    Recurrent urinary tract infection- provided reassurance and discussed American Urologic Association Guidelines for recurrent urinary tract infections in women.         Discussed that routine screening UA for infection is not recommended as in postmenopausal women asymptomatic bacteruria is common.  Furthermore, clinicians should not treat asymptomatic bacteriuria in patients.     Discussed that to make a diagnosis of UTI, clinicians must document positive urine cultures.  An abnormal urine dip indicates need for further investigation via urinalysis with microscopy and/or urine culture.     Encouraged patient to request urine culture every time she is being evaluated for UTI symptoms.     Discussed the importance of hydration for UTI prevention.  Also  could consider initiating a cranberry supplement as well as D-Mannose for prevention since she reports her previous UTIs have been E coli.      She will have CT and then cystoscopy.    She is advised to call for any urinary symptoms for culture.     Discussed with patient that vaginal atrophy is thinning, drying, and inflammation of the vaginal walls, due to drop in estrogen production.  Atrophic vaginitis frequently causes vaginal irritation and pain and also leads to urinary symptoms as well as increased susceptibility to urinary tract infections.  Recommend trial of vaginal estrogen cream  Side effects of localized estrogen discussed with patient. Most common side effect is vaginal discharge. Discussed associated black box warning and contraindications, does not exhibit any of these. Agreeable to trial of localized estrogen cream.  Order placed.       Follow Up   Return for cystoscopy with ABBY Barrios prior, F/U with me in 3 months.  Patient was given instructions and counseling regarding her condition or for health maintenance advice. Please see specific information pulled into the AVS if appropriate.         This document has been electronically signed by SILVANA Gong  February 23, 2023 14:32 EST

## 2023-03-24 ENCOUNTER — LAB (OUTPATIENT)
Dept: LAB | Facility: HOSPITAL | Age: 68
End: 2023-03-24
Payer: COMMERCIAL

## 2023-03-24 ENCOUNTER — HOSPITAL ENCOUNTER (OUTPATIENT)
Dept: CT IMAGING | Facility: HOSPITAL | Age: 68
Discharge: HOME | End: 2023-03-24
Payer: COMMERCIAL

## 2023-03-24 DIAGNOSIS — E78.5 HYPERLIPIDEMIA, UNSPECIFIED HYPERLIPIDEMIA TYPE: Chronic | ICD-10-CM

## 2023-03-24 DIAGNOSIS — R31.0 GROSS HEMATURIA: ICD-10-CM

## 2023-03-24 DIAGNOSIS — E55.9 VITAMIN D DEFICIENCY: ICD-10-CM

## 2023-03-24 DIAGNOSIS — R73.03 PREDIABETES: ICD-10-CM

## 2023-03-24 DIAGNOSIS — I10 ESSENTIAL HYPERTENSION: Chronic | ICD-10-CM

## 2023-03-24 LAB
25(OH)D3 SERPL-MCNC: 40 NG/ML (ref 30–100)
ALBUMIN SERPL-MCNC: 4.5 G/DL (ref 3.5–5.2)
ALBUMIN/GLOB SERPL: 1.6 G/DL
ALP SERPL-CCNC: 119 U/L (ref 39–117)
ALT SERPL W P-5'-P-CCNC: 28 U/L (ref 1–33)
ANION GAP SERPL CALCULATED.3IONS-SCNC: 10 MMOL/L (ref 5–15)
AST SERPL-CCNC: 20 U/L (ref 1–32)
BILIRUB SERPL-MCNC: 0.3 MG/DL (ref 0–1.2)
BUN SERPL-MCNC: 17 MG/DL (ref 8–23)
BUN/CREAT SERPL: 14.2 (ref 7–25)
CALCIUM SPEC-SCNC: 9.8 MG/DL (ref 8.6–10.5)
CHLORIDE SERPL-SCNC: 100 MMOL/L (ref 98–107)
CHOLEST SERPL-MCNC: 162 MG/DL (ref 0–200)
CO2 SERPL-SCNC: 27 MMOL/L (ref 22–29)
CREAT BLDA-MCNC: 1.3 MG/DL
CREAT SERPL-MCNC: 1.2 MG/DL (ref 0.57–1)
EGFRCR SERPLBLD CKD-EPI 2021: 44.6 ML/MIN/1.73
EGFRCR SERPLBLD CKD-EPI 2021: 49.1 ML/MIN/1.73
GLOBULIN UR ELPH-MCNC: 2.9 GM/DL
GLUCOSE SERPL-MCNC: 94 MG/DL (ref 65–99)
HBA1C MFR BLD: 5.5 % (ref 4.8–5.6)
HDLC SERPL-MCNC: 44 MG/DL (ref 40–60)
LDLC SERPL CALC-MCNC: 95 MG/DL (ref 0–100)
LDLC/HDLC SERPL: 2.09 {RATIO}
POTASSIUM SERPL-SCNC: 4.1 MMOL/L (ref 3.5–5.2)
PROT SERPL-MCNC: 7.4 G/DL (ref 6–8.5)
SODIUM SERPL-SCNC: 137 MMOL/L (ref 136–145)
TRIGL SERPL-MCNC: 130 MG/DL (ref 0–150)
VLDLC SERPL-MCNC: 23 MG/DL (ref 5–40)

## 2023-03-24 PROCEDURE — 25510000001 IOPAMIDOL PER 1 ML: Performed by: NURSE PRACTITIONER

## 2023-03-24 PROCEDURE — 74178 CT ABD&PLV WO CNTR FLWD CNTR: CPT

## 2023-03-24 PROCEDURE — 82565 ASSAY OF CREATININE: CPT

## 2023-03-24 PROCEDURE — 83036 HEMOGLOBIN GLYCOSYLATED A1C: CPT

## 2023-03-24 PROCEDURE — 80061 LIPID PANEL: CPT

## 2023-03-24 PROCEDURE — 80053 COMPREHEN METABOLIC PANEL: CPT

## 2023-03-24 PROCEDURE — 82306 VITAMIN D 25 HYDROXY: CPT

## 2023-03-24 PROCEDURE — 36415 COLL VENOUS BLD VENIPUNCTURE: CPT

## 2023-03-24 RX ADMIN — IOPAMIDOL 100 ML: 755 INJECTION, SOLUTION INTRAVENOUS at 13:06

## 2023-03-28 ENCOUNTER — LAB (OUTPATIENT)
Dept: LAB | Facility: HOSPITAL | Age: 68
End: 2023-03-28
Payer: COMMERCIAL

## 2023-03-28 ENCOUNTER — TELEPHONE (OUTPATIENT)
Dept: SURGERY | Facility: CLINIC | Age: 68
End: 2023-03-28
Payer: COMMERCIAL

## 2023-03-28 ENCOUNTER — TELEPHONE (OUTPATIENT)
Dept: UROLOGY | Facility: CLINIC | Age: 68
End: 2023-03-28
Payer: COMMERCIAL

## 2023-03-28 DIAGNOSIS — N39.0 URINARY TRACT INFECTION WITH HEMATURIA, SITE UNSPECIFIED: Primary | ICD-10-CM

## 2023-03-28 DIAGNOSIS — R31.9 URINARY TRACT INFECTION WITH HEMATURIA, SITE UNSPECIFIED: Primary | ICD-10-CM

## 2023-03-28 PROCEDURE — 87086 URINE CULTURE/COLONY COUNT: CPT | Performed by: NURSE PRACTITIONER

## 2023-03-28 NOTE — TELEPHONE ENCOUNTER
PATIENT CALLING STATES SHE IS HAVING BURNING AND PASSING SOME BLOOD. SHE FEELS LIKE SHE HAS ANOTHER UTI. # [phone].

## 2023-03-28 NOTE — TELEPHONE ENCOUNTER
Called patient to let her know that I put in an order for a urine culture to be collected at any [facility] facility lab. Patient stated she will go right now to our [facility] lab and she will let us know once she has done it so we can keep an eye out for her results. I told her once we have a final result in 48hrs that I would call her and let her know results. I explained to her that she can take her Pyridium after she collects the urine because that med will cause a false positive. She verbalized understanding and thanked me for getting back in touch with her.

## 2023-03-30 ENCOUNTER — TELEPHONE (OUTPATIENT)
Dept: UROLOGY | Facility: CLINIC | Age: 68
End: 2023-03-30
Payer: COMMERCIAL

## 2023-03-30 LAB — BACTERIA SPEC AEROBE CULT: NO GROWTH

## 2023-04-25 DIAGNOSIS — K21.9 GASTRO-ESOPHAGEAL REFLUX DISEASE WITHOUT ESOPHAGITIS: ICD-10-CM

## 2023-04-25 DIAGNOSIS — F33.0 MILD RECURRENT MAJOR DEPRESSION: ICD-10-CM

## 2023-04-25 DIAGNOSIS — E78.5 HYPERLIPIDEMIA, UNSPECIFIED HYPERLIPIDEMIA TYPE: Chronic | ICD-10-CM

## 2023-04-25 DIAGNOSIS — I10 ESSENTIAL HYPERTENSION: Chronic | ICD-10-CM

## 2023-04-25 RX ORDER — LISINOPRIL 10 MG/1
10 TABLET ORAL DAILY
Qty: 90 TABLET | Refills: 1 | Status: SHIPPED | OUTPATIENT
Start: 2023-04-25

## 2023-04-25 RX ORDER — ESOMEPRAZOLE MAGNESIUM 40 MG/1
40 CAPSULE, DELAYED RELEASE ORAL
Qty: 90 CAPSULE | Refills: 1 | Status: SHIPPED | OUTPATIENT
Start: 2023-04-25

## 2023-04-25 RX ORDER — ATORVASTATIN CALCIUM 10 MG/1
10 TABLET, FILM COATED ORAL NIGHTLY
Qty: 90 TABLET | Refills: 1 | Status: SHIPPED | OUTPATIENT
Start: 2023-04-25

## 2023-04-25 RX ORDER — FLUOXETINE HYDROCHLORIDE 40 MG/1
40 CAPSULE ORAL DAILY
Qty: 90 CAPSULE | Refills: 1 | Status: SHIPPED | OUTPATIENT
Start: 2023-04-25

## 2023-05-13 PROBLEM — R31.0 GROSS HEMATURIA: Status: ACTIVE | Noted: 2023-05-13

## 2023-05-13 NOTE — PROGRESS NOTES
Procedures       Urinalysis was checked today and was negative for signs of infection      Cytoscopy Procedure:     Procedure: Flexible cytoscope was passed per urethra into the bladder without difficulty after proper consent. The bladder was inspected in a systematic meridian fashion.       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  She does have a 1 small area 4 mm of hypervascularity and erythema on the posterior wall.      There were no tumors, lesions, stones, or other abnormalities noted within the bladder. Of note, there was no increased vascularity as well. Both ureteral orifices were identified and were normal in appearance. The flexible cytoscope was removed. The patient tolerated the procedure well.             Chief Complaint: Urologic complaint    Subjective         History of Present Illness  Zahira Perea is a 68 y.o. female          History of Present Illness  Zahira Perea is a 67 y.o. female      Urge incontinence  Gross hematuria         Patient has had some intermittent gross hematuria, Not always with infection.  She has had cystoscopy many years ago for gross hematuria and she has had blood intermittently in her urine for many years.    Bothersome for many years      2/23 CT uro-- possible cystocele.  Diverticulosis.,  Previous hysterectomy.  Images reviewed, delayed phase not complete      Since Saturday has been having intermittent urgency and urge incontinence has worsened       reports every time she has had a UTI it typically grows E coli.        Smoker- vape       No history of  malignancy in her family, no history of  surgery, nocturia 2-3      No anticoagulation     Patient does deal with constipation.  Bothersome  Has a bowel movement every 3 days.      PVR      5/23   000       Urine culture  2/7/2023 50,000 colony-forming units per mL of E. coli pansensitive                          Objective     Past Medical History:    Diagnosis Date   • Arthritis 2018   • Cervical disc disorder 2020   • Depression    • Disorder of white blood cells    • Essential (primary) hypertension    • Gastro-esophageal reflux disease without esophagitis    • Hypercholesterolemia    • Hyperlipidemia, unspecified    • Low back pain 2020   • Low blood sugar    • Lumbosacral disc disease 2020   • Major depressive disorder, recurrent    • Stage 3 chronic kidney disease    • Vitamin D deficiency        Past Surgical History:   Procedure Laterality Date   • COLONOSCOPY  09/2019   • EPIDURAL BLOCK  2021   • HYSTERECTOMY           Current Outpatient Medications:   •  atorvastatin (LIPITOR) 10 MG tablet, TAKE 1 TABLET BY MOUTH EVERY NIGHT, Disp: 90 tablet, Rfl: 1  •  buPROPion XL (WELLBUTRIN XL) 150 MG 24 hr tablet, Take 1 tablet by mouth Every Morning., Disp: , Rfl:   •  cholecalciferol (VITAMIN D3) 25 MCG (1000 UT) tablet, Take 2,000 Units by mouth Daily., Disp: , Rfl:   •  esomeprazole (nexIUM) 40 MG capsule, TAKE 1 CAPSULE BY MOUTH EVERY MORNING BEFORE BREAKFAST, Disp: 90 capsule, Rfl: 1  •  estradiol (ESTRACE) 0.1 MG/GM vaginal cream, Insert 1 g into the vagina 2 (Two) Times a Week for 90 days., Disp: 42.5 g, Rfl: 6  •  FLUoxetine (PROzac) 40 MG capsule, TAKE 1 CAPSULE BY MOUTH DAILY, Disp: 90 capsule, Rfl: 1  •  gabapentin (NEURONTIN) 300 MG capsule, gabapentin 300 mg capsule  Take 1 capsule 3 times a day by oral route as directed for 30 days., Disp: , Rfl:   •  HYDROcodone-acetaminophen (NORCO) 5-325 MG per tablet, Take 1 tablet by mouth Every 6 (Six) Hours As Needed., Disp: , Rfl:   •  lisinopril (PRINIVIL,ZESTRIL) 10 MG tablet, TAKE 1 TABLET BY MOUTH DAILY, Disp: 90 tablet, Rfl: 1  •  metFORMIN ER (Glucophage XR) 500 MG 24 hr tablet, Take 1 tablet by mouth Daily With Breakfast., Disp: 90 tablet, Rfl: 1  •  phenazopyridine (Pyridium) 200 MG tablet, Take 1 tablet by mouth 3 (Three) Times a Day As Needed for Bladder Spasms., Disp: 21 tablet, Rfl: 0  •   phentermine (ADIPEX-P) 37.5 MG tablet, Take 0.5 tablets by mouth Every Morning., Disp: , Rfl:   •  topiramate (TOPAMAX) 25 MG tablet, topiramate 25 mg tablet, Disp: , Rfl:   •  traZODone (DESYREL) 50 MG tablet, Take 1 tablet by mouth every night at bedtime., Disp: , Rfl:     Allergies   Allergen Reactions   • Promethazine Other (See Comments)     Family history of hallucinations when using it.        Family History   Problem Relation Age of Onset   • Arthritis Mother    • Stroke Mother    • Cancer Father    • Hyperlipidemia Father    • Vision loss Father    • Diabetes Sister    • Thyroid disease Sister    • Heart disease Brother         HEART CONDITION        Social History     Socioeconomic History   • Marital status:    Tobacco Use   • Smoking status: Former     Packs/day: 0.50     Years: 30.00     Pack years: 15.00     Types: Electronic Cigarette, Cigarettes     Start date:      Quit date:      Years since quittin.3   • Smokeless tobacco: Never   Vaping Use   • Vaping Use: Every day   Substance and Sexual Activity   • Alcohol use: Not Currently   • Drug use: Never   • Sexual activity: Yes     Partners: Male     Birth control/protection: Surgical, Post-menopausal       Vital Signs:   There were no vitals taken for this visit.     Physical exam    Alert and orient x3  Well appearing, well developed, in no acute distress   Unlabored respirations  Nontender/nondistended      Grossly oriented to person, place and time, judgment is intact, normal mood and affect    Results for orders placed or performed in visit on 23   Urine Culture - Urine, Urine, Clean Catch    Specimen: Urine, Clean Catch   Result Value Ref Range    Urine Culture No growth              Assessment and Plan    Diagnoses and all orders for this visit:    1. Gross hematuria (Primary)  -     Cystoscopy           Bladder Scan interpretation 05/15/2023    Estimation of residual urine via BVI 3000 Verathon Bladder Scan  MA/nurse  performing: Arnoldo WADE  Residual Urine: 0 ml  Indication: Gross hematuria    Urge incontinence   Position: Supine  Examination: Incremental scanning of the suprapubic area using 2.0 MHz transducer using copious amounts of acoustic gel.   Findings: An anechoic area was demonstrated which represented the bladder, with measurement of residual urine as noted. I inspected this myself. In that the residual urine was stable or insignificant, refer to plan for treatment and plan necessary at this time.         Urge urinary incontinence    Patient is very bothered after discussion we will place her on Myrbetriq 50 mg daily.  Risk benefits discussed      Urine culture in a few days she continues to have symptom      Dysuria    Pyridium as needed, refilled today      Lesion of bladder      On cystoscopy today patient does have some abnormal tissue, erythema and hypervascularity.  I did discuss cystoscopy bladder biopsy fulguration, bilateral retrograde pyelograms.  Risks and benefits were discussed including bleeding, infection and damage to the urinary system.  We also discussed the risk of anesthesia up to and including death.  Patient voiced understanding and would like to proceed.           This document has been electronically signed by Bartolo Barrios MD  May 13, 2023 05:01 EDT

## 2023-05-15 ENCOUNTER — PROCEDURE VISIT (OUTPATIENT)
Dept: UROLOGY | Facility: CLINIC | Age: 68
End: 2023-05-15
Payer: COMMERCIAL

## 2023-05-15 ENCOUNTER — PREP FOR SURGERY (OUTPATIENT)
Dept: OTHER | Facility: HOSPITAL | Age: 68
End: 2023-05-15
Payer: COMMERCIAL

## 2023-05-15 DIAGNOSIS — N32.9 LESION OF BLADDER: ICD-10-CM

## 2023-05-15 DIAGNOSIS — N32.9 LESION OF BLADDER: Primary | ICD-10-CM

## 2023-05-15 DIAGNOSIS — N39.41 URGE INCONTINENCE: ICD-10-CM

## 2023-05-15 DIAGNOSIS — R31.0 GROSS HEMATURIA: Primary | ICD-10-CM

## 2023-05-15 LAB — SPECIMEN VOL 24H UR: NORMAL L

## 2023-05-15 PROCEDURE — 99214 OFFICE O/P EST MOD 30 MIN: CPT | Performed by: UROLOGY

## 2023-05-15 PROCEDURE — 51798 US URINE CAPACITY MEASURE: CPT | Performed by: UROLOGY

## 2023-05-15 PROCEDURE — 52000 CYSTOURETHROSCOPY: CPT | Performed by: UROLOGY

## 2023-05-15 RX ORDER — SODIUM CHLORIDE 9 MG/ML
100 INJECTION, SOLUTION INTRAVENOUS CONTINUOUS
OUTPATIENT
Start: 2023-05-15

## 2023-05-15 RX ORDER — LEVOFLOXACIN 5 MG/ML
500 INJECTION, SOLUTION INTRAVENOUS ONCE
OUTPATIENT
Start: 2023-05-15 | End: 2023-05-15

## 2023-05-15 RX ORDER — PHENAZOPYRIDINE HYDROCHLORIDE 100 MG/1
100 TABLET, FILM COATED ORAL 2 TIMES DAILY PRN
Qty: 30 TABLET | Refills: 1 | Status: SHIPPED | OUTPATIENT
Start: 2023-05-15

## 2023-05-19 ENCOUNTER — TELEPHONE (OUTPATIENT)
Dept: UROLOGY | Facility: CLINIC | Age: 68
End: 2023-05-19
Payer: COMMERCIAL

## 2023-05-19 NOTE — TELEPHONE ENCOUNTER
Talked to patient about her getting a urine culture before her visit on Tuesday, May 23rd. Patient stated that she had not goent o the lab. Told patient that it takes 48 hours to grow a culture and Monday would most likely be too late. Patient also asked if she needed to come see us on Tuesday, she mentioned that she has a biopsy coming up on 6/8/2023. I told her that I would send Jackelyn a message and ask her what she wanted to do. I told the patient I would get back with her after talking to Jackelyn on Monday

## 2023-06-02 ENCOUNTER — LAB (OUTPATIENT)
Dept: LAB | Facility: HOSPITAL | Age: 68
End: 2023-06-02
Payer: COMMERCIAL

## 2023-06-02 DIAGNOSIS — N32.9 LESION OF BLADDER: ICD-10-CM

## 2023-06-02 DIAGNOSIS — R31.0 GROSS HEMATURIA: ICD-10-CM

## 2023-06-02 DIAGNOSIS — N39.41 URGE INCONTINENCE: ICD-10-CM

## 2023-06-02 PROCEDURE — 87186 SC STD MICRODIL/AGAR DIL: CPT

## 2023-06-02 PROCEDURE — 87086 URINE CULTURE/COLONY COUNT: CPT

## 2023-06-02 PROCEDURE — 87088 URINE BACTERIA CULTURE: CPT

## 2023-06-04 LAB — BACTERIA SPEC AEROBE CULT: ABNORMAL

## 2023-06-05 ENCOUNTER — TELEPHONE (OUTPATIENT)
Dept: UROLOGY | Facility: CLINIC | Age: 68
End: 2023-06-05
Payer: COMMERCIAL

## 2023-06-05 DIAGNOSIS — N30.00 ACUTE CYSTITIS WITHOUT HEMATURIA: Primary | ICD-10-CM

## 2023-06-05 RX ORDER — CEPHALEXIN 500 MG/1
500 CAPSULE ORAL 3 TIMES DAILY
Qty: 21 CAPSULE | Refills: 0 | Status: SHIPPED | OUTPATIENT
Start: 2023-06-05 | End: 2023-06-12

## 2023-06-05 NOTE — TELEPHONE ENCOUNTER
----- Message from Bartolo Barrios MD sent at 6/3/2023  2:13 PM EDT -----  Regarding: surgery      Has surgery this Thursday, urine culture come back positive, we need to cancel and move out at least a week.  Send this task back to me so I can follow-up on her culture to get her on some antibiotics

## 2023-06-05 NOTE — TELEPHONE ENCOUNTER
LVM for patient to call office back. Need to know if she is having any UTI symptoms. If not, we will send in keflex 100mg TID x1 week leading up to surgery and move her surgery out to next Thursday.   If she is having symptoms we will have to move it out a couple weeks and get a repeat culture.

## 2023-06-05 NOTE — TELEPHONE ENCOUNTER
CALLED AND SPOKE WITH PATIENT, SHE IS AGREEABLE TO DO KEFLEX TID X7 DAYS. SHE WILL REPEAT UCX ONCE SHE HAS COMPLETED ABX. SURGERY IS MOVED OUT TO 6/22/23

## 2023-06-05 NOTE — TELEPHONE ENCOUNTER
PATIENT CALLED AND I RELAYED THE MESSAGE, PER ALEJANDRA:   LVM for patient to call office back. Need to know if she is having any UTI symptoms. If not, we will send in keflex 100mg TID x1 week leading up to surgery and move her surgery out to next Thursday.   If she is having symptoms we will have to move it out a couple weeks and get a repeat culture.        PATIENT SAID SHE IS HAVING URGENCY, BURNING, AND FREQUENCY, BUT SHE SAID SHE HAS THIS OFTEN.    I TOLD HER I WOULD ASK JAYCOB TO CALL HER BACK TO DISCUSS.

## 2023-06-14 ENCOUNTER — LAB (OUTPATIENT)
Dept: LAB | Facility: HOSPITAL | Age: 68
End: 2023-06-14
Payer: COMMERCIAL

## 2023-06-14 DIAGNOSIS — N30.00 ACUTE CYSTITIS WITHOUT HEMATURIA: ICD-10-CM

## 2023-06-14 PROCEDURE — 87086 URINE CULTURE/COLONY COUNT: CPT

## 2023-06-15 LAB — BACTERIA SPEC AEROBE CULT: NO GROWTH

## 2023-06-19 ENCOUNTER — TELEPHONE (OUTPATIENT)
Dept: UROLOGY | Facility: CLINIC | Age: 68
End: 2023-06-19
Payer: COMMERCIAL

## 2023-06-19 NOTE — TELEPHONE ENCOUNTER
Called and made patient aware surgery is good for Thursday.  Urine culture was negative. She will get a call Wednesday regarding an arrival time. Patient agreed and voiced understanding.

## 2023-06-19 NOTE — TELEPHONE ENCOUNTER
Pt is needing to know what to do for her procedure on Thursday. She never received anything in the mail about what to do.

## 2023-07-21 PROBLEM — N39.0 RECURRENT URINARY TRACT INFECTION: Status: ACTIVE | Noted: 2023-07-21

## 2023-12-17 DIAGNOSIS — I10 ESSENTIAL HYPERTENSION: Chronic | ICD-10-CM

## 2023-12-17 DIAGNOSIS — F33.0 MILD RECURRENT MAJOR DEPRESSION: ICD-10-CM

## 2023-12-17 DIAGNOSIS — E78.5 HYPERLIPIDEMIA, UNSPECIFIED HYPERLIPIDEMIA TYPE: Chronic | ICD-10-CM

## 2023-12-18 RX ORDER — ATORVASTATIN CALCIUM 10 MG/1
10 TABLET, FILM COATED ORAL NIGHTLY
Qty: 90 TABLET | Refills: 0 | Status: SHIPPED | OUTPATIENT
Start: 2023-12-18

## 2023-12-18 RX ORDER — FLUOXETINE HYDROCHLORIDE 40 MG/1
40 CAPSULE ORAL DAILY
Qty: 90 CAPSULE | Refills: 0 | Status: SHIPPED | OUTPATIENT
Start: 2023-12-18

## 2023-12-18 RX ORDER — LISINOPRIL 10 MG/1
10 TABLET ORAL DAILY
Qty: 90 TABLET | Refills: 0 | Status: SHIPPED | OUTPATIENT
Start: 2023-12-18

## 2023-12-19 ENCOUNTER — OFFICE VISIT (OUTPATIENT)
Dept: INTERNAL MEDICINE | Facility: CLINIC | Age: 68
End: 2023-12-19
Payer: COMMERCIAL

## 2023-12-19 VITALS
TEMPERATURE: 97 F | HEART RATE: 69 BPM | HEIGHT: 66 IN | SYSTOLIC BLOOD PRESSURE: 116 MMHG | OXYGEN SATURATION: 96 % | DIASTOLIC BLOOD PRESSURE: 70 MMHG | WEIGHT: 197 LBS | BODY MASS INDEX: 31.66 KG/M2

## 2023-12-19 DIAGNOSIS — K59.09 CHRONIC CONSTIPATION: ICD-10-CM

## 2023-12-19 DIAGNOSIS — Z23 NEED FOR PROPHYLACTIC VACCINATION AGAINST STREPTOCOCCUS PNEUMONIAE (PNEUMOCOCCUS) AND INFLUENZA: ICD-10-CM

## 2023-12-19 DIAGNOSIS — R73.03 PREDIABETES: ICD-10-CM

## 2023-12-19 DIAGNOSIS — K21.9 GASTRO-ESOPHAGEAL REFLUX DISEASE WITHOUT ESOPHAGITIS: Chronic | ICD-10-CM

## 2023-12-19 DIAGNOSIS — Z00.00 ANNUAL PHYSICAL EXAM: Primary | ICD-10-CM

## 2023-12-19 DIAGNOSIS — E55.9 VITAMIN D DEFICIENCY: ICD-10-CM

## 2023-12-19 DIAGNOSIS — E78.5 HYPERLIPIDEMIA, UNSPECIFIED HYPERLIPIDEMIA TYPE: Chronic | ICD-10-CM

## 2023-12-19 DIAGNOSIS — I10 ESSENTIAL HYPERTENSION: Chronic | ICD-10-CM

## 2023-12-19 RX ORDER — ONDANSETRON 8 MG/1
8 TABLET, ORALLY DISINTEGRATING ORAL ONCE
COMMUNITY
Start: 2023-11-07

## 2023-12-19 RX ORDER — PLECANATIDE 3 MG/1
3 TABLET ORAL DAILY
Qty: 90 TABLET | Refills: 3 | Status: SHIPPED | OUTPATIENT
Start: 2023-12-19

## 2023-12-19 NOTE — PROGRESS NOTES
Chief Complaint  Follow-up (The patient wants to make sure she is in good health, check up. Medication reconciliation.)  Subjective    History of Present Illness  Zahira Perea is a 68 y.o. female who presents to [facility] for Her annual physical exam and follow-up for hypertension, hyperlipidemia, prediabetes, GERD and vitamin D deficiency. She has been going to a weight clinic but stopped. She has chronic constipation and is asking about medication. She has used many different otc laxatives with no relief.        Current Outpatient Medications:     atorvastatin (LIPITOR) 10 MG tablet, TAKE 1 TABLET BY MOUTH EVERY NIGHT, Disp: 90 tablet, Rfl: 0    buPROPion XL (WELLBUTRIN XL) 300 MG 24 hr tablet, Take 1 tablet by mouth Daily., Disp: , Rfl:     cholecalciferol (VITAMIN D3) 25 MCG (1000 UT) tablet, Take 2 tablets by mouth Daily., Disp: , Rfl:     esomeprazole (nexIUM) 40 MG capsule, TAKE 1 CAPSULE BY MOUTH EVERY MORNING BEFORE BREAKFAST, Disp: 90 capsule, Rfl: 1    FLUoxetine (PROzac) 40 MG capsule, TAKE 1 CAPSULE BY MOUTH DAILY, Disp: 90 capsule, Rfl: 0    gabapentin (NEURONTIN) 300 MG capsule, Take 1 capsule by mouth 3 (Three) Times a Day As Needed., Disp: , Rfl:     HYDROcodone-acetaminophen (NORCO) 5-325 MG per tablet, Take 1 tablet by mouth Every 6 (Six) Hours As Needed., Disp: , Rfl:     lisinopril (PRINIVIL,ZESTRIL) 10 MG tablet, TAKE 1 TABLET BY MOUTH DAILY, Disp: 90 tablet, Rfl: 0    ondansetron ODT (ZOFRAN-ODT) 8 MG disintegrating tablet, Place 1 tablet on the tongue 1 (One) Time., Disp: , Rfl:     phenazopyridine (PYRIDIUM) 100 MG tablet, Take 1 tablet by mouth 2 (Two) Times a Day As Needed for Bladder Spasms., Disp: 30 tablet, Rfl: 1    topiramate (TOPAMAX) 25 MG tablet, Take 1 tablet by mouth Every Night., Disp: , Rfl:     traZODone (DESYREL) 50 MG tablet, Take 1 tablet by mouth every night at bedtime., Disp: , Rfl:     Plecanatide (Trulance) 3 MG tablet, Take 1  "tablet by mouth Daily., Disp: 90 tablet, Rfl: 3    Tirzepatide-Weight Management (ZEPBOUND) 2.5 MG/0.5ML solution auto-injector, Inject 0.5 mL under the skin into the appropriate area as directed 1 (One) Time Per Week., Disp: 2 mL, Rfl: 0  Allergies   Allergen Reactions    Promethazine Other (See Comments)     Family history of hallucinations when using it. PT REPORTS HAS NEVER HAD REACTION TO IT BUT SEVERAL FAMILY MEMBERS HAVE       Past Medical History:   Diagnosis Date    Arthritis 2018    Cervical disc disorder 2020    Depression     Diabetes mellitus     Disorder of white blood cells     DENIES ANY CURRENT ISSUES    Essential (primary) hypertension     Gastro-esophageal reflux disease without esophagitis     Hypercholesterolemia     Hyperlipidemia, unspecified     Low back pain 2020    Lumbar spondylosis     Lumbosacral disc disease 2020    Pain management     Stage 3 chronic kidney disease     Vitamin D deficiency       Past Surgical History:   Procedure Laterality Date    COLONOSCOPY  09/2019    CYSTOSCOPY BLADDER BIOPSY Bilateral 6/22/2023    Procedure: CYSTOSCOPY BLADDER BIOPSY AND FULGERATION,BILATERAL RETROGRADE PYLEOGRAM;  Surgeon: Bartolo Barrios MD;  Location: [facility];  Service: Urology;  Laterality: Bilateral;    EPIDURAL BLOCK  2021    HYSTERECTOMY          Objective   /70 (BP Location: Left arm, Patient Position: Sitting, Cuff Size: Adult)   Pulse 69   Temp 97 °F (36.1 °C) (Temporal)   Ht 167.6 cm (66\")   Wt 89.4 kg (197 lb)   SpO2 96%   BMI 31.80 kg/m²    Estimated body mass index is 31.8 kg/m² as calculated from the following:    Height as of this encounter: 167.6 cm (66\").    Weight as of this encounter: 89.4 kg (197 lb).     Physical Exam  Vitals reviewed.   Constitutional:       General: She is not in acute distress.  HENT:      Head: Normocephalic and atraumatic.      Right Ear: Tympanic membrane and ear canal normal.      Left Ear: Tympanic membrane and ear canal normal. "   Eyes:      Conjunctiva/sclera: Conjunctivae normal.   Cardiovascular:      Rate and Rhythm: Normal rate and regular rhythm.      Heart sounds: Normal heart sounds. No murmur heard.  Pulmonary:      Effort: Pulmonary effort is normal.      Breath sounds: Normal breath sounds. No wheezing, rhonchi or rales.   Abdominal:      General: There is no distension.      Palpations: Abdomen is soft. There is no mass.      Tenderness: There is no abdominal tenderness.   Musculoskeletal:      Right lower leg: No edema.      Left lower leg: No edema.   Lymphadenopathy:      Cervical: No cervical adenopathy.   Skin:     General: Skin is warm and dry.      Coloration: Skin is not jaundiced or pale.   Neurological:      General: No focal deficit present.      Mental Status: She is alert.   Psychiatric:         Mood and Affect: Mood normal.         Thought Content: Thought content normal.          Result Review :  The following data was reviewed by: SILVANA Rodriguez on 12/19/2023:  Common labs          3/24/2023    10:10 3/24/2023    12:56   Common Labs   Glucose 94     BUN 17     Creatinine 1.20  1.30    Sodium 137     Potassium 4.1     Chloride 100     Calcium 9.8     Albumin 4.5     Total Bilirubin 0.3     Alkaline Phosphatase 119     AST (SGOT) 20     ALT (SGPT) 28     Total Cholesterol 162     Triglycerides 130     HDL Cholesterol 44     LDL Cholesterol  95     Hemoglobin A1C 5.50                   Assessment and Plan   Diagnoses and all orders for this visit:    1. Annual physical exam (Primary)  -     Comprehensive Metabolic Panel; Future  -     CBC & Differential; Future  -     Lipid Panel; Future  -     T4, Free; Future  -     TSH; Future    2. Essential hypertension  -     Magnesium; Future  -     Folate; Future  -     Vitamin B12; Future    3. Hyperlipidemia, unspecified hyperlipidemia type    4. Prediabetes  -     Hemoglobin A1c; Future    5. Vitamin D deficiency  -     Vitamin D,25-Hydroxy; Future    6.  Gastro-esophageal reflux disease without esophagitis    7. Chronic constipation    8. Need for prophylactic vaccination against Streptococcus pneumoniae (pneumococcus) and influenza  -     Fluzone High-Dose 65+yrs  -     Pneumococcal Conjugate Vaccine 20-Valent All    Other orders  -     Tirzepatide-Weight Management (ZEPBOUND) 2.5 MG/0.5ML solution auto-injector; Inject 0.5 mL under the skin into the appropriate area as directed 1 (One) Time Per Week.  Dispense: 2 mL; Refill: 0  -     Plecanatide (Trulance) 3 MG tablet; Take 1 tablet by mouth Daily.  Dispense: 90 tablet; Refill: 3      Annual exam: Discussed healthy diet, exercise, adequate sleep, cancer screening, immunizations and preventative care. Annual eye exam and twice yearly dental cleaning.    Hypertension: Stable on lisinopril 10 mg daily and to continue.      Hyperlipidemia: Stable on atorvastatin 10 mg daily and to continue.     Prediabetes: Last HA1C normal.  Monitoring.    Vitamin D deficiency: Last level normal. Monitoring.     GERD: Stable on Nexium 40 mg daily and to continue.     Chronic constipation: Start Trulance 3 mg daily.  Follow-up if not improving.    Patient was given instructions and counseling regarding her condition or for health maintenance advice. Please see specific information pulled into the AVS if appropriate.     Follow Up   Return in about 6 months (around 6/19/2024) for Recheck.    Dictated Utilizing Dragon Dictation.  Please note that portions of this note were completed with a voice recognition program.  Part of this note may be an electronic transcription/translation of spoken language to printed text using the Dragon Dictation System.    SILVANA Rodriguez

## 2023-12-20 ENCOUNTER — LAB (OUTPATIENT)
Dept: INTERNAL MEDICINE | Facility: CLINIC | Age: 68
End: 2023-12-20
Payer: COMMERCIAL

## 2023-12-20 DIAGNOSIS — R73.03 PREDIABETES: ICD-10-CM

## 2023-12-20 DIAGNOSIS — E55.9 VITAMIN D DEFICIENCY: ICD-10-CM

## 2023-12-20 DIAGNOSIS — Z00.00 ANNUAL PHYSICAL EXAM: ICD-10-CM

## 2023-12-20 DIAGNOSIS — I10 ESSENTIAL HYPERTENSION: Chronic | ICD-10-CM

## 2023-12-20 LAB
25(OH)D3 SERPL-MCNC: 28.7 NG/ML (ref 30–100)
ALBUMIN SERPL-MCNC: 4.6 G/DL (ref 3.5–5.2)
ALBUMIN/GLOB SERPL: 1.6 G/DL
ALP SERPL-CCNC: 98 U/L (ref 39–117)
ALT SERPL W P-5'-P-CCNC: 21 U/L (ref 1–33)
ANION GAP SERPL CALCULATED.3IONS-SCNC: 11 MMOL/L (ref 5–15)
AST SERPL-CCNC: 16 U/L (ref 1–32)
BASOPHILS # BLD AUTO: 0.03 10*3/MM3 (ref 0–0.2)
BASOPHILS NFR BLD AUTO: 0.4 % (ref 0–1.5)
BILIRUB SERPL-MCNC: 0.4 MG/DL (ref 0–1.2)
BUN SERPL-MCNC: 13 MG/DL (ref 8–23)
BUN/CREAT SERPL: 11.3 (ref 7–25)
CALCIUM SPEC-SCNC: 9.5 MG/DL (ref 8.6–10.5)
CHLORIDE SERPL-SCNC: 104 MMOL/L (ref 98–107)
CHOLEST SERPL-MCNC: 198 MG/DL (ref 0–200)
CO2 SERPL-SCNC: 23 MMOL/L (ref 22–29)
CREAT SERPL-MCNC: 1.15 MG/DL (ref 0.57–1)
DEPRECATED RDW RBC AUTO: 45.1 FL (ref 37–54)
EGFRCR SERPLBLD CKD-EPI 2021: 51.7 ML/MIN/1.73
EOSINOPHIL # BLD AUTO: 0.14 10*3/MM3 (ref 0–0.4)
EOSINOPHIL NFR BLD AUTO: 1.9 % (ref 0.3–6.2)
ERYTHROCYTE [DISTWIDTH] IN BLOOD BY AUTOMATED COUNT: 13.3 % (ref 12.3–15.4)
FOLATE SERPL-MCNC: 3.89 NG/ML (ref 4.78–24.2)
GLOBULIN UR ELPH-MCNC: 2.9 GM/DL
GLUCOSE SERPL-MCNC: 90 MG/DL (ref 65–99)
HBA1C MFR BLD: 5.7 % (ref 4.8–5.6)
HCT VFR BLD AUTO: 39.1 % (ref 34–46.6)
HDLC SERPL-MCNC: 54 MG/DL (ref 40–60)
HGB BLD-MCNC: 12.4 G/DL (ref 12–15.9)
IMM GRANULOCYTES # BLD AUTO: 0.02 10*3/MM3 (ref 0–0.05)
IMM GRANULOCYTES NFR BLD AUTO: 0.3 % (ref 0–0.5)
LDLC SERPL CALC-MCNC: 122 MG/DL (ref 0–100)
LDLC/HDLC SERPL: 2.21 {RATIO}
LYMPHOCYTES # BLD AUTO: 3.03 10*3/MM3 (ref 0.7–3.1)
LYMPHOCYTES NFR BLD AUTO: 40.5 % (ref 19.6–45.3)
MAGNESIUM SERPL-MCNC: 1.8 MG/DL (ref 1.6–2.4)
MCH RBC QN AUTO: 29.5 PG (ref 26.6–33)
MCHC RBC AUTO-ENTMCNC: 31.7 G/DL (ref 31.5–35.7)
MCV RBC AUTO: 92.9 FL (ref 79–97)
MONOCYTES # BLD AUTO: 0.44 10*3/MM3 (ref 0.1–0.9)
MONOCYTES NFR BLD AUTO: 5.9 % (ref 5–12)
NEUTROPHILS NFR BLD AUTO: 3.82 10*3/MM3 (ref 1.7–7)
NEUTROPHILS NFR BLD AUTO: 51 % (ref 42.7–76)
NRBC BLD AUTO-RTO: 0.1 /100 WBC (ref 0–0.2)
PLATELET # BLD AUTO: 361 10*3/MM3 (ref 140–450)
PMV BLD AUTO: 10 FL (ref 6–12)
POTASSIUM SERPL-SCNC: 4 MMOL/L (ref 3.5–5.2)
PROT SERPL-MCNC: 7.5 G/DL (ref 6–8.5)
RBC # BLD AUTO: 4.21 10*6/MM3 (ref 3.77–5.28)
SODIUM SERPL-SCNC: 138 MMOL/L (ref 136–145)
T4 FREE SERPL-MCNC: 0.98 NG/DL (ref 0.93–1.7)
TRIGL SERPL-MCNC: 122 MG/DL (ref 0–150)
TSH SERPL DL<=0.05 MIU/L-ACNC: 2.42 UIU/ML (ref 0.27–4.2)
VIT B12 BLD-MCNC: 264 PG/ML (ref 211–946)
VLDLC SERPL-MCNC: 22 MG/DL (ref 5–40)
WBC NRBC COR # BLD AUTO: 7.48 10*3/MM3 (ref 3.4–10.8)

## 2023-12-20 PROCEDURE — 85025 COMPLETE CBC W/AUTO DIFF WBC: CPT | Performed by: NURSE PRACTITIONER

## 2023-12-20 PROCEDURE — 36415 COLL VENOUS BLD VENIPUNCTURE: CPT | Performed by: NURSE PRACTITIONER

## 2023-12-20 PROCEDURE — 80053 COMPREHEN METABOLIC PANEL: CPT | Performed by: NURSE PRACTITIONER

## 2023-12-20 PROCEDURE — 83735 ASSAY OF MAGNESIUM: CPT | Performed by: NURSE PRACTITIONER

## 2023-12-20 PROCEDURE — 82306 VITAMIN D 25 HYDROXY: CPT | Performed by: NURSE PRACTITIONER

## 2023-12-20 PROCEDURE — 80061 LIPID PANEL: CPT | Performed by: NURSE PRACTITIONER

## 2023-12-20 PROCEDURE — 84443 ASSAY THYROID STIM HORMONE: CPT | Performed by: NURSE PRACTITIONER

## 2023-12-20 PROCEDURE — 83036 HEMOGLOBIN GLYCOSYLATED A1C: CPT | Performed by: NURSE PRACTITIONER

## 2023-12-20 PROCEDURE — 84439 ASSAY OF FREE THYROXINE: CPT | Performed by: NURSE PRACTITIONER

## 2023-12-20 PROCEDURE — 82746 ASSAY OF FOLIC ACID SERUM: CPT | Performed by: NURSE PRACTITIONER

## 2023-12-20 PROCEDURE — 82607 VITAMIN B-12: CPT | Performed by: NURSE PRACTITIONER

## 2023-12-21 DIAGNOSIS — I10 ESSENTIAL HYPERTENSION: ICD-10-CM

## 2023-12-21 DIAGNOSIS — E78.5 HYPERLIPIDEMIA, UNSPECIFIED HYPERLIPIDEMIA TYPE: ICD-10-CM

## 2023-12-21 DIAGNOSIS — R73.03 PREDIABETES: ICD-10-CM

## 2023-12-21 DIAGNOSIS — R73.01 IMPAIRED FASTING GLUCOSE: ICD-10-CM

## 2023-12-21 DIAGNOSIS — E53.8 FOLIC ACID DEFICIENCY: ICD-10-CM

## 2023-12-21 DIAGNOSIS — E55.9 VITAMIN D DEFICIENCY: Primary | ICD-10-CM

## 2023-12-21 RX ORDER — FOLIC ACID 1 MG/1
1 TABLET ORAL DAILY
Qty: 90 TABLET | Refills: 0 | Status: SHIPPED | OUTPATIENT
Start: 2023-12-21

## 2023-12-21 RX ORDER — ERGOCALCIFEROL 1.25 MG/1
CAPSULE ORAL
Qty: 13 CAPSULE | Refills: 0 | Status: SHIPPED | OUTPATIENT
Start: 2023-12-21

## 2024-01-19 RX ORDER — TIRZEPATIDE 2.5 MG/.5ML
INJECTION, SOLUTION SUBCUTANEOUS
Qty: 2 ML | Refills: 0 | Status: SHIPPED | OUTPATIENT
Start: 2024-01-19

## 2024-01-20 PROBLEM — N39.46 MIXED INCONTINENCE: Status: ACTIVE | Noted: 2024-01-20

## 2024-01-23 ENCOUNTER — TELEPHONE (OUTPATIENT)
Dept: UROLOGY | Facility: CLINIC | Age: 69
End: 2024-01-23
Payer: COMMERCIAL

## 2024-01-25 NOTE — TELEPHONE ENCOUNTER
2ND CALL - CALLED PT TO OFFER R/S OF CX'D APPT 1/22 W/ DANAE.    SPOKE W/ PT AND R/S APPT    Future Appointments         Provider Department Center    2/23/2024 2:45 PM Bartolo Barrios MD [facility] UROLOGY [facility]    6/21/2024 10:45 AM Paulette Hernandez APRN [facility] [place]

## 2024-02-21 PROBLEM — N39.46 MIXED STRESS AND URGE URINARY INCONTINENCE: Status: ACTIVE | Noted: 2023-05-15

## 2024-02-21 RX ORDER — TOPIRAMATE 25 MG/1
25 TABLET ORAL NIGHTLY
Qty: 90 TABLET | Refills: 0 | Status: SHIPPED | OUTPATIENT
Start: 2024-02-21

## 2024-02-21 RX ORDER — BUPROPION HYDROCHLORIDE 300 MG/1
300 TABLET ORAL DAILY
Qty: 90 TABLET | Refills: 0 | Status: SHIPPED | OUTPATIENT
Start: 2024-02-21

## 2024-02-21 NOTE — PROGRESS NOTES
Chief Complaint: Urologic complaint    Subjective         History of Present Illness  Zahira Perea is a 68 y.o. female          History of Present Illness  Zahira Perea is a 67 y.o. female      Urge incontinence  Gross hematuria  Recurrent urinary tract infection -on self start antibiotics        MiraLAX - did not help    Has a lot of trouble with constipation.     PCP has placed her on medication and has her doing better, bowel movement about every other day now        Currently on Keflex 500 mg p.o. 3 times daily x3 days she has taken this twice since prescription 6 months ago.  Does help.    Patient also takes Azo as needed for burning    Drinking Mainly water    Off caffeine.      PVR    2/24 000 5/23 000      Wearing 3 pads daily.  Patient does have stress incontinence also but urgency deftly bothers her the most      DM - controlled    Previous      6/22/2023 CBF with bilateral retrogrades -hypervascularity posteriorly, minimal erythema on the right posterior wall and posteriorly.  Biopsies taken.  Bilateral retrogrades normal  Pathology-chronic cystitis    6/3/2023 urine culture-E. Coli -move surgery out,   Keflex 500 mg p.o. 3 times daily x7 days leading up to surgery  3/23 1.2, GFR 49    She has urge incontinence regardless of UTI, no symptoms of burning/dysuria symptoms.  Only when she has infection.    Myrbetriq 50 - did not help    Cranberry tablet did not help      Patient has had some intermittent gross hematuria, Not always with infection.  She has had cystoscopy many years ago for gross hematuria and she has had blood intermittently in her urine for many years.    Bothersome for many years      2/23 CT uro-- possible cystocele.  Diverticulosis.,  Previous hysterectomy.  Images reviewed, delayed phase not complete       Smoker- vape       No history of  malignancy in her family, no history of  surgery, nocturia 2-3      No anticoagulation            Urine culture  2/7/2023 50,000  colony-forming units per mL of E. coli pansensitive                  Bladder Scan interpretation 02/23/2024    Estimation of residual urine via BVI 3000 Verathon Bladder Scan  MA/nurse performing: DIMA Mendes  Residual Urine: 000 ml  Indication: Lesion of bladder    Gross hematuria    Recurrent urinary tract infection    Mixed stress and urge urinary incontinence   Position: Supine  Examination: Incremental scanning of the suprapubic area using 2.0 MHz transducer using copious amounts of acoustic gel.   Findings: An anechoic area was demonstrated which represented the bladder, with measurement of residual urine as noted. I inspected this myself. In that the residual urine was stable or insignificant, refer to plan for treatment and plan necessary at this time.              Objective     Past Medical History:   Diagnosis Date    Arthritis 2018    Cervical disc disorder 2020    Depression     Diabetes mellitus     Disorder of white blood cells     DENIES ANY CURRENT ISSUES    Essential (primary) hypertension     Gastro-esophageal reflux disease without esophagitis     Hypercholesterolemia     Hyperlipidemia, unspecified     Low back pain 2020    Lumbar spondylosis     Lumbosacral disc disease 2020    Pain management     Stage 3 chronic kidney disease     Vitamin D deficiency                     Vital Signs:   There were no vitals taken for this visit.          Bladder Scan interpretation 02/23/2024    Estimation of residual urine via BVI 3000 Verathon Bladder Scan  MA/nurse performing: Cinthya CH  Residual Urine: 0 ml  Indication: Lesion of bladder    Gross hematuria    Recurrent urinary tract infection    Mixed stress and urge urinary incontinence   Position: Supine  Examination: Incremental scanning of the suprapubic area using 2.0 MHz transducer using copious amounts of acoustic gel.   Findings: An anechoic area was demonstrated which represented the bladder, with measurement of residual urine as noted. I inspected  this myself. In that the residual urine was stable or insignificant, refer to plan for treatment and plan necessary at this time.                Assessment and Plan    Diagnoses and all orders for this visit:    1. Lesion of bladder (Primary)    2. Gross hematuria    3. Recurrent urinary tract infection    4. Mixed stress and urge urinary incontinence            Recurrent urinary tract infection    Continue drinking plenty of fluids  Continue constipation management  Cont self start antibiotics in the form of Keflex 500 mg p.o. 3 times daily x3 days,         Mixed urinary incontinence    Try Gemtesa 75 samples and she will let us know if this helps.  Risk/benefits discussed.      If this does not work patient would be interested in Botox.  Risk and benefits discussed including risk of retention 5%.  She voiced understanding    She will call let us know.      Follow-up in 2 months

## 2024-02-23 ENCOUNTER — OFFICE VISIT (OUTPATIENT)
Dept: UROLOGY | Facility: CLINIC | Age: 69
End: 2024-02-23
Payer: COMMERCIAL

## 2024-02-23 VITALS — WEIGHT: 208 LBS | RESPIRATION RATE: 16 BRPM | BODY MASS INDEX: 33.43 KG/M2 | HEIGHT: 66 IN

## 2024-02-23 DIAGNOSIS — N39.0 RECURRENT URINARY TRACT INFECTION: ICD-10-CM

## 2024-02-23 DIAGNOSIS — N32.9 LESION OF BLADDER: Primary | ICD-10-CM

## 2024-02-23 DIAGNOSIS — N39.46 MIXED STRESS AND URGE URINARY INCONTINENCE: ICD-10-CM

## 2024-02-23 DIAGNOSIS — R31.0 GROSS HEMATURIA: ICD-10-CM

## 2024-02-23 LAB
BILIRUB BLD-MCNC: NEGATIVE MG/DL
CLARITY, POC: ABNORMAL
COLOR UR: ABNORMAL
EXPIRATION DATE: ABNORMAL
GLUCOSE UR STRIP-MCNC: ABNORMAL MG/DL
KETONES UR QL: NEGATIVE
LEUKOCYTE EST, POC: ABNORMAL
Lab: ABNORMAL
NITRITE UR-MCNC: POSITIVE MG/ML
PH UR: 6.5 [PH] (ref 5–8)
PROT UR STRIP-MCNC: NEGATIVE MG/DL
RBC # UR STRIP: NEGATIVE /UL
SP GR UR: 1.01 (ref 1–1.03)
SPECIMEN VOL 24H UR: 0 L
UROBILINOGEN UR QL: NORMAL

## 2024-02-23 RX ORDER — CEPHALEXIN 500 MG/1
CAPSULE ORAL
COMMUNITY
Start: 2024-01-04

## 2024-03-15 RX ORDER — TIRZEPATIDE 5 MG/.5ML
INJECTION, SOLUTION SUBCUTANEOUS
Qty: 2 ML | Refills: 0 | Status: SHIPPED | OUTPATIENT
Start: 2024-03-15

## 2024-03-16 DIAGNOSIS — I10 ESSENTIAL HYPERTENSION: Chronic | ICD-10-CM

## 2024-03-16 DIAGNOSIS — F33.0 MILD RECURRENT MAJOR DEPRESSION: ICD-10-CM

## 2024-03-16 DIAGNOSIS — E78.5 HYPERLIPIDEMIA, UNSPECIFIED HYPERLIPIDEMIA TYPE: Chronic | ICD-10-CM

## 2024-03-18 RX ORDER — LISINOPRIL 10 MG/1
10 TABLET ORAL DAILY
Qty: 90 TABLET | Refills: 0 | Status: SHIPPED | OUTPATIENT
Start: 2024-03-18

## 2024-03-18 RX ORDER — FLUOXETINE HYDROCHLORIDE 40 MG/1
40 CAPSULE ORAL DAILY
Qty: 90 CAPSULE | Refills: 0 | Status: SHIPPED | OUTPATIENT
Start: 2024-03-18

## 2024-03-18 RX ORDER — ATORVASTATIN CALCIUM 10 MG/1
10 TABLET, FILM COATED ORAL NIGHTLY
Qty: 90 TABLET | Refills: 0 | Status: SHIPPED | OUTPATIENT
Start: 2024-03-18

## 2024-03-21 RX ORDER — ERGOCALCIFEROL 1.25 MG/1
CAPSULE ORAL
Qty: 13 CAPSULE | Refills: 0 | Status: SHIPPED | OUTPATIENT
Start: 2024-03-21

## 2024-03-21 RX ORDER — FOLIC ACID 1 MG/1
1 TABLET ORAL DAILY
Qty: 90 TABLET | Refills: 0 | Status: SHIPPED | OUTPATIENT
Start: 2024-03-21

## 2024-04-03 DIAGNOSIS — K21.9 GASTRO-ESOPHAGEAL REFLUX DISEASE WITHOUT ESOPHAGITIS: ICD-10-CM

## 2024-04-03 RX ORDER — ESOMEPRAZOLE MAGNESIUM 40 MG/1
40 CAPSULE, DELAYED RELEASE ORAL
Qty: 90 CAPSULE | Refills: 1 | Status: SHIPPED | OUTPATIENT
Start: 2024-04-03

## 2024-04-10 RX ORDER — TIRZEPATIDE 5 MG/.5ML
INJECTION, SOLUTION SUBCUTANEOUS
Qty: 2 ML | Refills: 0 | Status: SHIPPED | OUTPATIENT
Start: 2024-04-10

## 2024-04-21 NOTE — PROGRESS NOTES
Chief Complaint: Urologic complaint    Subjective         History of Present Illness  Zahira Perea is a 69 y.o. female          History of Present Illness  Zahira Perea is a 67 y.o. female      Urge incontinence  Gross hematuria  Recurrent urinary tract infection -on self start antibiotics        Patient is doing better with constipation with her current regimen.  Has had a lot of trouble in the past.    Doing much better since her last appointment.    Has been on Gemtesa 75 mg for the last 2 months, everything is improved.  No incontinence currently      PCP has placed her on medication and has her doing better, bowel movement about every other day now      On self start antibiotics in the form of Keflex 500 mg p.o. 3 times daily x3 days, has not used yet      Previous    Currently on Keflex 500 mg p.o. 3 times daily x3 days she has taken this twice since prescription 6 months ago.  Does help.    Patient also takes Azo as needed for burning    Drinking Mainly water    Off caffeine.      PVR    2/24   000  5/23   000      Wearing 3 pads daily.  Patient does have stress incontinence also but urgency deftly bothers her the most      DM - controlled        Previous      MiraLAX - did not help    6/22/2023 CBF with bilateral retrogrades -hypervascularity posteriorly, minimal erythema on the right posterior wall and posteriorly.  Biopsies taken.  Bilateral retrogrades normal  Pathology-chronic cystitis    6/3/2023 urine culture-E. Coli -move surgery out,   Keflex 500 mg p.o. 3 times daily x7 days leading up to surgery  3/23 1.2, GFR 49    She has urge incontinence regardless of UTI, no symptoms of burning/dysuria symptoms.  Only when she has infection.    Myrbetriq 50 - did not help    Cranberry tablet did not help      Patient has had some intermittent gross hematuria, Not always with infection.  She has had cystoscopy many years ago for gross hematuria and she has had blood intermittently in her urine for  many years.    Bothersome for many years      2/23 CT uro-- possible cystocele.  Diverticulosis.,  Previous hysterectomy.  Images reviewed, delayed phase not complete       Smoker- vape       No history of  malignancy in her family, no history of  surgery, nocturia 2-3      No anticoagulation            Urine culture  2/7/2023 50,000 colony-forming units per mL of E. coli pansensitive                               Vital Signs:   There were no vitals taken for this visit.                Assessment and Plan    Diagnoses and all orders for this visit:    1. Gross hematuria (Primary)    2. Recurrent urinary tract infection    3. Mixed stress and urge urinary incontinence    4. Lesion of bladder            Recurrent urinary tract infection    Cont self start antibiotics in the form of Keflex 500 mg p.o. 3 times daily x3 days,         Mixed urinary incontinence    Cont Gemtesa 75 QD    Prescription written today.      F/u in 1 year with NP

## 2024-04-23 ENCOUNTER — OFFICE VISIT (OUTPATIENT)
Dept: UROLOGY | Facility: CLINIC | Age: 69
End: 2024-04-23
Payer: COMMERCIAL

## 2024-04-23 VITALS — BODY MASS INDEX: 33.75 KG/M2 | RESPIRATION RATE: 16 BRPM | WEIGHT: 210 LBS | HEIGHT: 66 IN

## 2024-04-23 DIAGNOSIS — N39.0 RECURRENT URINARY TRACT INFECTION: ICD-10-CM

## 2024-04-23 DIAGNOSIS — R31.0 GROSS HEMATURIA: Primary | ICD-10-CM

## 2024-04-23 DIAGNOSIS — N39.46 MIXED STRESS AND URGE URINARY INCONTINENCE: ICD-10-CM

## 2024-04-23 DIAGNOSIS — N32.9 LESION OF BLADDER: ICD-10-CM

## 2024-04-23 PROCEDURE — 99213 OFFICE O/P EST LOW 20 MIN: CPT | Performed by: UROLOGY

## 2024-05-23 RX ORDER — ORAL SEMAGLUTIDE 3 MG/1
1 TABLET ORAL DAILY
Qty: 30 TABLET | Refills: 0 | Status: SHIPPED | OUTPATIENT
Start: 2024-05-23

## 2024-06-07 RX ORDER — TOPIRAMATE 25 MG/1
25 TABLET ORAL NIGHTLY
Qty: 90 TABLET | Refills: 0 | Status: SHIPPED | OUTPATIENT
Start: 2024-06-07

## 2024-06-14 ENCOUNTER — LAB (OUTPATIENT)
Dept: LAB | Facility: HOSPITAL | Age: 69
End: 2024-06-14
Payer: COMMERCIAL

## 2024-06-14 DIAGNOSIS — E53.8 FOLIC ACID DEFICIENCY: ICD-10-CM

## 2024-06-14 DIAGNOSIS — E78.5 HYPERLIPIDEMIA, UNSPECIFIED HYPERLIPIDEMIA TYPE: ICD-10-CM

## 2024-06-14 DIAGNOSIS — R73.03 PREDIABETES: ICD-10-CM

## 2024-06-14 DIAGNOSIS — E55.9 VITAMIN D DEFICIENCY: ICD-10-CM

## 2024-06-14 DIAGNOSIS — I10 ESSENTIAL HYPERTENSION: ICD-10-CM

## 2024-06-14 LAB
25(OH)D3 SERPL-MCNC: 64.3 NG/ML (ref 30–100)
ALBUMIN SERPL-MCNC: 4.5 G/DL (ref 3.5–5.2)
ALBUMIN/GLOB SERPL: 1.6 G/DL
ALP SERPL-CCNC: 98 U/L (ref 39–117)
ALT SERPL W P-5'-P-CCNC: 21 U/L (ref 1–33)
ANION GAP SERPL CALCULATED.3IONS-SCNC: 10.7 MMOL/L (ref 5–15)
AST SERPL-CCNC: 19 U/L (ref 1–32)
BASOPHILS # BLD AUTO: 0.03 10*3/MM3 (ref 0–0.2)
BASOPHILS NFR BLD AUTO: 0.4 % (ref 0–1.5)
BILIRUB SERPL-MCNC: 0.3 MG/DL (ref 0–1.2)
BUN SERPL-MCNC: 13 MG/DL (ref 8–23)
BUN/CREAT SERPL: 11.8 (ref 7–25)
CALCIUM SPEC-SCNC: 9.6 MG/DL (ref 8.6–10.5)
CHLORIDE SERPL-SCNC: 103 MMOL/L (ref 98–107)
CHOLEST SERPL-MCNC: 166 MG/DL (ref 0–200)
CO2 SERPL-SCNC: 24.3 MMOL/L (ref 22–29)
CREAT SERPL-MCNC: 1.1 MG/DL (ref 0.57–1)
DEPRECATED RDW RBC AUTO: 45.9 FL (ref 37–54)
EGFRCR SERPLBLD CKD-EPI 2021: 54.2 ML/MIN/1.73
EOSINOPHIL # BLD AUTO: 0.1 10*3/MM3 (ref 0–0.4)
EOSINOPHIL NFR BLD AUTO: 1.3 % (ref 0.3–6.2)
ERYTHROCYTE [DISTWIDTH] IN BLOOD BY AUTOMATED COUNT: 13.1 % (ref 12.3–15.4)
FOLATE SERPL-MCNC: >20 NG/ML (ref 4.78–24.2)
GLOBULIN UR ELPH-MCNC: 2.8 GM/DL
GLUCOSE SERPL-MCNC: 73 MG/DL (ref 65–99)
HBA1C MFR BLD: 5.4 % (ref 4.8–5.6)
HCT VFR BLD AUTO: 40.5 % (ref 34–46.6)
HDLC SERPL-MCNC: 53 MG/DL (ref 40–60)
HGB BLD-MCNC: 13.5 G/DL (ref 12–15.9)
IMM GRANULOCYTES # BLD AUTO: 0.02 10*3/MM3 (ref 0–0.05)
IMM GRANULOCYTES NFR BLD AUTO: 0.3 % (ref 0–0.5)
LDLC SERPL CALC-MCNC: 84 MG/DL (ref 0–100)
LDLC/HDLC SERPL: 1.49 {RATIO}
LYMPHOCYTES # BLD AUTO: 4.12 10*3/MM3 (ref 0.7–3.1)
LYMPHOCYTES NFR BLD AUTO: 51.6 % (ref 19.6–45.3)
MAGNESIUM SERPL-MCNC: 2.3 MG/DL (ref 1.6–2.4)
MCH RBC QN AUTO: 31.8 PG (ref 26.6–33)
MCHC RBC AUTO-ENTMCNC: 33.3 G/DL (ref 31.5–35.7)
MCV RBC AUTO: 95.3 FL (ref 79–97)
MONOCYTES # BLD AUTO: 0.48 10*3/MM3 (ref 0.1–0.9)
MONOCYTES NFR BLD AUTO: 6 % (ref 5–12)
NEUTROPHILS NFR BLD AUTO: 3.23 10*3/MM3 (ref 1.7–7)
NEUTROPHILS NFR BLD AUTO: 40.4 % (ref 42.7–76)
NRBC BLD AUTO-RTO: 0 /100 WBC (ref 0–0.2)
PLATELET # BLD AUTO: 317 10*3/MM3 (ref 140–450)
PMV BLD AUTO: 10.2 FL (ref 6–12)
POTASSIUM SERPL-SCNC: 4 MMOL/L (ref 3.5–5.2)
PROT SERPL-MCNC: 7.3 G/DL (ref 6–8.5)
RBC # BLD AUTO: 4.25 10*6/MM3 (ref 3.77–5.28)
SODIUM SERPL-SCNC: 138 MMOL/L (ref 136–145)
T4 FREE SERPL-MCNC: 1.19 NG/DL (ref 0.92–1.68)
TRIGL SERPL-MCNC: 169 MG/DL (ref 0–150)
TSH SERPL DL<=0.05 MIU/L-ACNC: 3.05 UIU/ML (ref 0.27–4.2)
VIT B12 BLD-MCNC: 258 PG/ML (ref 211–946)
VLDLC SERPL-MCNC: 29 MG/DL (ref 5–40)
WBC NRBC COR # BLD AUTO: 7.98 10*3/MM3 (ref 3.4–10.8)

## 2024-06-14 PROCEDURE — 83036 HEMOGLOBIN GLYCOSYLATED A1C: CPT

## 2024-06-14 PROCEDURE — 85025 COMPLETE CBC W/AUTO DIFF WBC: CPT

## 2024-06-14 PROCEDURE — 83735 ASSAY OF MAGNESIUM: CPT

## 2024-06-14 PROCEDURE — 36415 COLL VENOUS BLD VENIPUNCTURE: CPT

## 2024-06-14 PROCEDURE — 82746 ASSAY OF FOLIC ACID SERUM: CPT

## 2024-06-14 PROCEDURE — 80053 COMPREHEN METABOLIC PANEL: CPT

## 2024-06-14 PROCEDURE — 84439 ASSAY OF FREE THYROXINE: CPT

## 2024-06-14 PROCEDURE — 82306 VITAMIN D 25 HYDROXY: CPT

## 2024-06-14 PROCEDURE — 80061 LIPID PANEL: CPT

## 2024-06-14 PROCEDURE — 84443 ASSAY THYROID STIM HORMONE: CPT

## 2024-06-14 PROCEDURE — 82607 VITAMIN B-12: CPT

## 2024-06-18 DIAGNOSIS — I10 ESSENTIAL HYPERTENSION: Chronic | ICD-10-CM

## 2024-06-18 DIAGNOSIS — E78.5 HYPERLIPIDEMIA, UNSPECIFIED HYPERLIPIDEMIA TYPE: Chronic | ICD-10-CM

## 2024-06-18 RX ORDER — ATORVASTATIN CALCIUM 10 MG/1
10 TABLET, FILM COATED ORAL NIGHTLY
Qty: 90 TABLET | Refills: 0 | Status: SHIPPED | OUTPATIENT
Start: 2024-06-18

## 2024-06-18 RX ORDER — LISINOPRIL 10 MG/1
10 TABLET ORAL DAILY
Qty: 90 TABLET | Refills: 0 | Status: SHIPPED | OUTPATIENT
Start: 2024-06-18

## 2024-06-21 ENCOUNTER — OFFICE VISIT (OUTPATIENT)
Dept: INTERNAL MEDICINE | Age: 69
End: 2024-06-21
Payer: COMMERCIAL

## 2024-06-21 VITALS
SYSTOLIC BLOOD PRESSURE: 121 MMHG | TEMPERATURE: 97.3 F | OXYGEN SATURATION: 96 % | BODY MASS INDEX: 34.68 KG/M2 | HEART RATE: 65 BPM | WEIGHT: 215.8 LBS | DIASTOLIC BLOOD PRESSURE: 90 MMHG | HEIGHT: 66 IN

## 2024-06-21 DIAGNOSIS — K21.9 GASTRO-ESOPHAGEAL REFLUX DISEASE WITHOUT ESOPHAGITIS: ICD-10-CM

## 2024-06-21 DIAGNOSIS — F32.A ANXIETY AND DEPRESSION: ICD-10-CM

## 2024-06-21 DIAGNOSIS — Z12.31 ENCOUNTER FOR SCREENING MAMMOGRAM FOR MALIGNANT NEOPLASM OF BREAST: ICD-10-CM

## 2024-06-21 DIAGNOSIS — Z12.2 ENCOUNTER FOR SCREENING FOR MALIGNANT NEOPLASM OF LUNG IN FORMER SMOKER WHO QUIT IN PAST 15 YEARS WITH 30 PACK YEAR HISTORY OR GREATER: ICD-10-CM

## 2024-06-21 DIAGNOSIS — R73.03 PREDIABETES: ICD-10-CM

## 2024-06-21 DIAGNOSIS — E53.8 FOLIC ACID DEFICIENCY: ICD-10-CM

## 2024-06-21 DIAGNOSIS — I10 ESSENTIAL HYPERTENSION: Primary | ICD-10-CM

## 2024-06-21 DIAGNOSIS — I10 ESSENTIAL HYPERTENSION: Chronic | ICD-10-CM

## 2024-06-21 DIAGNOSIS — F41.9 ANXIETY AND DEPRESSION: ICD-10-CM

## 2024-06-21 DIAGNOSIS — Z87.891 ENCOUNTER FOR SCREENING FOR MALIGNANT NEOPLASM OF LUNG IN FORMER SMOKER WHO QUIT IN PAST 15 YEARS WITH 30 PACK YEAR HISTORY OR GREATER: ICD-10-CM

## 2024-06-21 DIAGNOSIS — E55.9 VITAMIN D DEFICIENCY: ICD-10-CM

## 2024-06-21 DIAGNOSIS — E78.5 HYPERLIPIDEMIA, UNSPECIFIED HYPERLIPIDEMIA TYPE: ICD-10-CM

## 2024-06-21 DIAGNOSIS — K59.09 CHRONIC CONSTIPATION: ICD-10-CM

## 2024-06-21 DIAGNOSIS — Z12.2 SCREENING FOR LUNG CANCER: ICD-10-CM

## 2024-06-21 PROCEDURE — 99214 OFFICE O/P EST MOD 30 MIN: CPT | Performed by: NURSE PRACTITIONER

## 2024-06-21 RX ORDER — ESOMEPRAZOLE MAGNESIUM 40 MG/1
40 CAPSULE, DELAYED RELEASE ORAL
Qty: 90 CAPSULE | Refills: 1 | Status: SHIPPED | OUTPATIENT
Start: 2024-06-21

## 2024-06-21 RX ORDER — TOPIRAMATE 25 MG/1
25 TABLET ORAL NIGHTLY
Qty: 90 TABLET | Refills: 1 | Status: SHIPPED | OUTPATIENT
Start: 2024-06-21

## 2024-06-21 RX ORDER — ESTRADIOL 0.1 MG/G
CREAM VAGINAL
COMMUNITY

## 2024-06-21 RX ORDER — BUPROPION HYDROCHLORIDE 300 MG/1
300 TABLET ORAL EVERY MORNING
Qty: 90 TABLET | Refills: 1 | Status: SHIPPED | OUTPATIENT
Start: 2024-06-21

## 2024-06-21 RX ORDER — TRAZODONE HYDROCHLORIDE 50 MG/1
50 TABLET ORAL
COMMUNITY
Start: 2024-06-21 | End: 2024-06-21 | Stop reason: SDUPTHER

## 2024-06-21 RX ORDER — TRAZODONE HYDROCHLORIDE 50 MG/1
50 TABLET ORAL
Qty: 90 TABLET | Refills: 1 | Status: SHIPPED | OUTPATIENT
Start: 2024-06-21

## 2024-06-21 NOTE — PROGRESS NOTES
Answers submitted by the patient for this visit:  Other (Submitted on 6/17/2024)  Please describe your symptoms.: Follow up visit.  Have you had these symptoms before?: Yes  How long have you been having these symptoms?: Greater than 2 weeks  Primary Reason for Visit (Submitted on 6/17/2024)  What is the primary reason for your visit?: Other  Chief Complaint  Follow-up (The patient is coming in for a 6 month follow up, labs done. The patient has no concerns. )  Subjective    History of Present Illness  Zahira Perea is a 69 y.o. female  presents to [facility] for follow-up hypertension, hyperlipidemia, prediabetes, GERD, chronic constipation and vitamin D deficiency.      Past Medical History:   Diagnosis Date    Arthritis 2018    Cervical disc disorder 2020    Depression     Diabetes mellitus     Disorder of white blood cells     DENIES ANY CURRENT ISSUES    Essential (primary) hypertension     Gastro-esophageal reflux disease without esophagitis     Hypercholesterolemia     Hyperlipidemia, unspecified     Low back pain 2020    Lumbar spondylosis     Lumbosacral disc disease 2020    Pain management     Stage 3 chronic kidney disease     Vitamin D deficiency         Past Surgical History:   Procedure Laterality Date    COLONOSCOPY  09/2019    CYSTOSCOPY BLADDER BIOPSY Bilateral 6/22/2023    Procedure: CYSTOSCOPY BLADDER BIOPSY AND FULGERATION,BILATERAL RETROGRADE PYLEOGRAM;  Surgeon: Bartolo Barrios MD;  Location: [facility] MAIN OR;  Service: Urology;  Laterality: Bilateral;    EPIDURAL BLOCK  2021    HYSTERECTOMY          Allergies   Allergen Reactions    Promethazine Other (See Comments)     Family history of hallucinations when using it. PT REPORTS HAS NEVER HAD REACTION TO IT BUT SEVERAL FAMILY MEMBERS HAVE           Current Outpatient Medications:     atorvastatin (LIPITOR) 10 MG tablet, TAKE 1 TABLET BY MOUTH EVERY NIGHT, Disp: 90 tablet, Rfl: 0    buPROPion XL (WELLBUTRIN  "XL) 300 MG 24 hr tablet, Take 1 tablet by mouth Every Morning., Disp: 90 tablet, Rfl: 1    esomeprazole (nexIUM) 40 MG capsule, Take 1 capsule by mouth Every Morning Before Breakfast., Disp: 90 capsule, Rfl: 1    estradiol (ESTRACE) 0.1 MG/GM vaginal cream, INSERT 1 GRAM VAGINALLY 2 TIMES A WEEK, Disp: , Rfl:     FLUoxetine (PROzac) 40 MG capsule, TAKE 1 CAPSULE BY MOUTH DAILY, Disp: 90 capsule, Rfl: 0    folic acid (FOLVITE) 1 MG tablet, TAKE 1 TABLET BY MOUTH DAILY, Disp: 90 tablet, Rfl: 0    gabapentin (NEURONTIN) 300 MG capsule, Take 1 capsule by mouth 3 (Three) Times a Day As Needed., Disp: , Rfl:     HYDROcodone-acetaminophen (NORCO) 5-325 MG per tablet, Take 1 tablet by mouth Every 6 (Six) Hours As Needed., Disp: , Rfl:     lisinopril (PRINIVIL,ZESTRIL) 10 MG tablet, TAKE 1 TABLET BY MOUTH DAILY, Disp: 90 tablet, Rfl: 0    Tirzepatide-Weight Management (ZEPBOUND) 10 MG/0.5ML solution auto-injector, Inject 0.5 mL under the skin into the appropriate area as directed 1 (One) Time Per Week., Disp: 2 mL, Rfl: 5    Tirzepatide-Weight Management (ZEPBOUND) 7.5 MG/0.5ML solution auto-injector, Inject 0.5 mL under the skin into the appropriate area as directed 1 (One) Time Per Week., Disp: 2 mL, Rfl: 0    topiramate (TOPAMAX) 25 MG tablet, Take 1 tablet by mouth Every Night., Disp: 90 tablet, Rfl: 1    traZODone (DESYREL) 50 MG tablet, Take 1 tablet by mouth every night at bedtime., Disp: 90 tablet, Rfl: 1    Vibegron 75 MG tablet, Take 1 tablet by mouth Daily., Disp: 30 tablet, Rfl: 11    vitamin D (ERGOCALCIFEROL) 1.25 MG (44966 UT) capsule capsule, TAKE 1 CAPSULE BY MOUTH 1 TIME EVERY WEEK WITH A FATTY MEAL, Disp: 13 capsule, Rfl: 0    Objective   /90 (BP Location: Right arm, Patient Position: Sitting, Cuff Size: Adult)   Pulse 65   Temp 97.3 °F (36.3 °C) (Temporal)   Ht 167.6 cm (66\")   Wt 97.9 kg (215 lb 12.8 oz)   SpO2 96%   BMI 34.83 kg/m²    Estimated body mass index is 34.83 kg/m² as calculated " "from the following:    Height as of this encounter: 167.6 cm (66\").    Weight as of this encounter: 97.9 kg (215 lb 12.8 oz).   Physical Exam  Vitals reviewed.   Constitutional:       General: She is not in acute distress.  HENT:      Head: Normocephalic and atraumatic.   Neck:      Comments: No thyroid enlargement  Cardiovascular:      Rate and Rhythm: Normal rate and regular rhythm.   Pulmonary:      Effort: Pulmonary effort is normal.      Breath sounds: Normal breath sounds. No wheezing, rhonchi or rales.   Musculoskeletal:      Right lower leg: No edema.      Left lower leg: No edema.   Lymphadenopathy:      Cervical: No cervical adenopathy.   Skin:     General: Skin is warm and dry.   Neurological:      General: No focal deficit present.      Mental Status: She is alert.   Psychiatric:         Thought Content: Thought content normal.        Result Review :  The following data was reviewed by: SILVANA Rodriguez on 06/21/2024:  Common labs          12/20/2023    11:20 6/14/2024    11:53   Common Labs   Glucose 90  73    BUN 13  13    Creatinine 1.15  1.10    Sodium 138  138    Potassium 4.0  4.0    Chloride 104  103    Calcium 9.5  9.6    Albumin 4.6  4.5    Total Bilirubin 0.4  0.3    Alkaline Phosphatase 98  98    AST (SGOT) 16  19    ALT (SGPT) 21  21    WBC 7.48  7.98    Hemoglobin 12.4  13.5    Hematocrit 39.1  40.5    Platelets 361  317    Total Cholesterol 198  166    Triglycerides 122  169    HDL Cholesterol 54  53    LDL Cholesterol  122  84    Hemoglobin A1C 5.70  5.40                  Assessment and Plan   Diagnoses and all orders for this visit:    1. Essential hypertension (Primary)  -     Comprehensive Metabolic Panel; Future  -     CBC & Differential; Future  -     TSH Rfx On Abnormal To Free T4; Future  -     Vitamin B12; Future  -     Folate; Future  -     Magnesium; Future    2. Hyperlipidemia, unspecified hyperlipidemia type  -     Comprehensive Metabolic Panel; Future  -     Lipid Panel; " Future    3. Prediabetes  -     Hemoglobin A1c; Future    4. Vitamin D deficiency  -     Vitamin D,25-Hydroxy; Future    5. Gastro-esophageal reflux disease without esophagitis  -     Vitamin B12; Future  -     Folate; Future    6. Chronic constipation    7. Anxiety and depression    8. Folic acid deficiency    9. Screening for lung cancer  -      CT Chest Low Dose Cancer Screening WO; Future    10. Encounter for screening for malignant neoplasm of lung in former smoker who quit in past 15 years with 30 pack year history or greater  -      CT Chest Low Dose Cancer Screening WO; Future    11. Encounter for screening mammogram for malignant neoplasm of breast  -     Mammo Screening Digital Tomosynthesis Bilateral With CAD; Future    12. Essential hypertension  Comments:  Stable on medication and to continue.  Orders:  -     Comprehensive Metabolic Panel; Future  -     CBC & Differential; Future  -     TSH Rfx On Abnormal To Free T4; Future  -     Vitamin B12; Future  -     Folate; Future  -     Magnesium; Future    Other orders  -     esomeprazole (nexIUM) 40 MG capsule; Take 1 capsule by mouth Every Morning Before Breakfast.  Dispense: 90 capsule; Refill: 1  -     traZODone (DESYREL) 50 MG tablet; Take 1 tablet by mouth every night at bedtime.  Dispense: 90 tablet; Refill: 1  -     topiramate (TOPAMAX) 25 MG tablet; Take 1 tablet by mouth Every Night.  Dispense: 90 tablet; Refill: 1  -     buPROPion XL (WELLBUTRIN XL) 300 MG 24 hr tablet; Take 1 tablet by mouth Every Morning.  Dispense: 90 tablet; Refill: 1       Hypertension: Blood pressure controlled on lisinopril 10 mg daily and to continue.      Hyperlipidemia: Stable on atorvastatin 10 mg daily and to continue.      Prediabetes: Last HA1C normal.  Monitor.     Vitamin D deficiency: Improved on 50,000 units weekly. Level normal. Monitor. Change to multivitamin when finished with weekly dose.      GERD: Stable on Nexium 40 mg daily and to continue.      Chronic  constipation:  Trulance 3 mg daily didn't help. She takes otc meds.      Insomnia: Improved on Trazodone 50 mg nightly and to continue.     Anxiety and depression: Stable on Wellbutrin 300 mg daily, Prozac 40 mg daily and Topamax 25 mg every evening.     Folic acid deficiency: Improved level on folic acid 1 mg daily.  Recommend decreasing to multivitamin with folic acid.     Patient was given instructions and counseling regarding her condition or for health maintenance advice. Please see specific information pulled into the AVS if appropriate.     Follow Up   Return in about 26 weeks (around 12/20/2024) for Annual physical.    Dictated Utilizing Dragon Dictation.  Please note that portions of this note were completed with a voice recognition program.  Part of this note may be an electronic transcription/translation of spoken language to printed text using the Dragon Dictation System.    SILVANA Rodriguez

## 2024-07-01 RX ORDER — TIRZEPATIDE 7.5 MG/.5ML
INJECTION, SOLUTION SUBCUTANEOUS
Qty: 2 ML | Refills: 0 | Status: SHIPPED | OUTPATIENT
Start: 2024-07-01

## 2024-07-02 ENCOUNTER — HOSPITAL ENCOUNTER (OUTPATIENT)
Dept: MAMMOGRAPHY | Facility: HOSPITAL | Age: 69
Discharge: HOME | End: 2024-07-02
Payer: COMMERCIAL

## 2024-07-02 ENCOUNTER — HOSPITAL ENCOUNTER (OUTPATIENT)
Dept: CT IMAGING | Facility: HOSPITAL | Age: 69
Discharge: HOME | End: 2024-07-02
Payer: COMMERCIAL

## 2024-07-02 DIAGNOSIS — Z87.891 ENCOUNTER FOR SCREENING FOR MALIGNANT NEOPLASM OF LUNG IN FORMER SMOKER WHO QUIT IN PAST 15 YEARS WITH 30 PACK YEAR HISTORY OR GREATER: ICD-10-CM

## 2024-07-02 DIAGNOSIS — Z12.2 SCREENING FOR LUNG CANCER: ICD-10-CM

## 2024-07-02 DIAGNOSIS — Z12.31 ENCOUNTER FOR SCREENING MAMMOGRAM FOR MALIGNANT NEOPLASM OF BREAST: ICD-10-CM

## 2024-07-02 DIAGNOSIS — Z12.2 ENCOUNTER FOR SCREENING FOR MALIGNANT NEOPLASM OF LUNG IN FORMER SMOKER WHO QUIT IN PAST 15 YEARS WITH 30 PACK YEAR HISTORY OR GREATER: ICD-10-CM

## 2024-07-02 PROCEDURE — 77063 BREAST TOMOSYNTHESIS BI: CPT

## 2024-07-02 PROCEDURE — 71271 CT THORAX LUNG CANCER SCR C-: CPT

## 2024-07-02 PROCEDURE — 77067 SCR MAMMO BI INCL CAD: CPT

## 2024-07-08 DIAGNOSIS — R91.8 ABNORMAL CT LUNG SCREENING: Primary | ICD-10-CM

## 2024-07-08 DIAGNOSIS — J43.9 PULMONARY EMPHYSEMA, UNSPECIFIED EMPHYSEMA TYPE: ICD-10-CM

## 2024-09-11 RX ORDER — TIRZEPATIDE 7.5 MG/.5ML
INJECTION, SOLUTION SUBCUTANEOUS
Qty: 2 ML | Refills: 0 | Status: CANCELLED | OUTPATIENT
Start: 2024-09-11

## 2024-09-16 DIAGNOSIS — E78.5 HYPERLIPIDEMIA, UNSPECIFIED HYPERLIPIDEMIA TYPE: Chronic | ICD-10-CM

## 2024-09-16 DIAGNOSIS — I10 ESSENTIAL HYPERTENSION: Chronic | ICD-10-CM

## 2024-09-16 RX ORDER — LISINOPRIL 10 MG/1
10 TABLET ORAL DAILY
Qty: 90 TABLET | Refills: 0 | Status: SHIPPED | OUTPATIENT
Start: 2024-09-16

## 2024-09-16 RX ORDER — ATORVASTATIN CALCIUM 10 MG/1
10 TABLET, FILM COATED ORAL NIGHTLY
Qty: 90 TABLET | Refills: 0 | Status: SHIPPED | OUTPATIENT
Start: 2024-09-16

## 2024-09-26 ENCOUNTER — PATIENT MESSAGE (OUTPATIENT)
Dept: INTERNAL MEDICINE | Age: 69
End: 2024-09-26
Payer: COMMERCIAL

## 2024-10-08 ENCOUNTER — OFFICE VISIT (OUTPATIENT)
Dept: INTERNAL MEDICINE | Age: 69
End: 2024-10-08
Payer: COMMERCIAL

## 2024-10-08 ENCOUNTER — TELEPHONE (OUTPATIENT)
Dept: INTERNAL MEDICINE | Age: 69
End: 2024-10-08

## 2024-10-08 VITALS
BODY MASS INDEX: 33.65 KG/M2 | TEMPERATURE: 97.2 F | OXYGEN SATURATION: 97 % | SYSTOLIC BLOOD PRESSURE: 122 MMHG | HEIGHT: 66 IN | WEIGHT: 209.4 LBS | DIASTOLIC BLOOD PRESSURE: 80 MMHG | HEART RATE: 53 BPM

## 2024-10-08 DIAGNOSIS — F32.1 MODERATE MAJOR DEPRESSION: Primary | ICD-10-CM

## 2024-10-08 DIAGNOSIS — E78.5 HYPERLIPIDEMIA, UNSPECIFIED HYPERLIPIDEMIA TYPE: ICD-10-CM

## 2024-10-08 DIAGNOSIS — I10 ESSENTIAL HYPERTENSION: ICD-10-CM

## 2024-10-08 DIAGNOSIS — E66.811 CLASS 1 OBESITY DUE TO EXCESS CALORIES WITH SERIOUS COMORBIDITY AND BODY MASS INDEX (BMI) OF 33.0 TO 33.9 IN ADULT: ICD-10-CM

## 2024-10-08 DIAGNOSIS — R73.03 PREDIABETES: ICD-10-CM

## 2024-10-08 DIAGNOSIS — E66.09 CLASS 1 OBESITY DUE TO EXCESS CALORIES WITH SERIOUS COMORBIDITY AND BODY MASS INDEX (BMI) OF 33.0 TO 33.9 IN ADULT: ICD-10-CM

## 2024-10-08 PROCEDURE — 99214 OFFICE O/P EST MOD 30 MIN: CPT | Performed by: NURSE PRACTITIONER

## 2024-10-08 NOTE — PROGRESS NOTES
Answers submitted by the patient for this visit:  Other (Submitted on 10/7/2024)  Please describe your symptoms.: Weight gain and questions about medication.  Have you had these symptoms before?: Yes  How long have you been having these symptoms?: Greater than 2 weeks  Please describe any probable cause for these symptoms. : Discontinued Zepbound  Primary Reason for Visit (Submitted on 10/7/2024)  What is the primary reason for your visit?: Problem Not Listed  Chief Complaint  Weight Check (The patient Is coming in for weight gain. She states she is very depressed about this. She tries to eat healthy. She does some walking. )    Subjective      History of Present Illness  The patient is a 69-year-old female who presents for evaluation of obesity and follow-up prediabetes, hypertension, hyperlipidemia and depression.    She reports experiencing depression due to her weight gain, which she attributes to poor dietary choices. She has a history of prediabetes and was previously informed that without weight loss, she could develop diabetes. Her last recorded A1c level was 5.4. She has been off her prescribed injections for over a month due to availability and insurance coverage issues. She also mentions having high blood pressure and cholesterol levels.    Supplemental Information  She had passed out couple of times several years ago and was tested for hypoglycemia.    FAMILY HISTORY  She has a family history of diabetes.       Past Medical History:   Diagnosis Date    Anxiety 2005    Arthritis 2018    Cervical disc disorder 2020    Depression     Disorder of white blood cells     DENIES ANY CURRENT ISSUES    Essential (primary) hypertension     Gastro-esophageal reflux disease without esophagitis     Hypercholesterolemia     Hyperlipidemia, unspecified     Low back pain 2020    Lumbar spondylosis     Lumbosacral disc disease 2020    Obesity 2016    Pain management     Stage 3 chronic kidney disease     Vitamin D  deficiency         Past Surgical History:   Procedure Laterality Date    COLONOSCOPY  2019    CYSTOSCOPY BLADDER BIOPSY Bilateral 2023    Procedure: CYSTOSCOPY BLADDER BIOPSY AND FULGERATION,BILATERAL RETROGRADE PYLEOGRAM;  Surgeon: Bartolo Barrios MD;  Location: [facility] MAIN OR;  Service: Urology;  Laterality: Bilateral;    EPIDURAL BLOCK      HYSTERECTOMY          Social History     Tobacco Use   Smoking Status Former    Current packs/day: 0.00    Average packs/day: 0.5 packs/day for 40.0 years (20.0 ttl pk-yrs)    Types: Cigarettes, Electronic Cigarette    Start date: 1970    Quit date:     Years since quittin.7   Smokeless Tobacco Never        Patient Care Team:  Paulette Hernandez APRN as PCP - General (Nurse Practitioner)  Sue Bernal APRN as Nurse Practitioner (Urology)  Bartolo Barrios MD as Consulting Physician (Urology)  Ashkan Blackmon Jr., MD as Consulting Physician (Pain Medicine)    Allergies   Allergen Reactions    Promethazine Other (See Comments)     Family history of hallucinations when using it. PT REPORTS HAS NEVER HAD REACTION TO IT BUT SEVERAL FAMILY MEMBERS HAVE           Current Outpatient Medications:     atorvastatin (LIPITOR) 10 MG tablet, TAKE 1 TABLET BY MOUTH EVERY NIGHT, Disp: 90 tablet, Rfl: 0    buPROPion XL (WELLBUTRIN XL) 300 MG 24 hr tablet, Take 1 tablet by mouth Every Morning., Disp: 90 tablet, Rfl: 1    esomeprazole (nexIUM) 40 MG capsule, Take 1 capsule by mouth Every Morning Before Breakfast., Disp: 90 capsule, Rfl: 1    estradiol (ESTRACE) 0.1 MG/GM vaginal cream, INSERT 1 GRAM VAGINALLY 2 TIMES A WEEK, Disp: , Rfl:     FLUoxetine (PROzac) 40 MG capsule, TAKE 1 CAPSULE BY MOUTH DAILY, Disp: 90 capsule, Rfl: 0    gabapentin (NEURONTIN) 300 MG capsule, Take 1 capsule by mouth 3 (Three) Times a Day As Needed., Disp: , Rfl:     HYDROcodone-acetaminophen (NORCO) 5-325 MG per tablet, Take 1 tablet by mouth Every 6 (Six) Hours As Needed.,  "Disp: , Rfl:     lisinopril (PRINIVIL,ZESTRIL) 10 MG tablet, TAKE 1 TABLET BY MOUTH DAILY, Disp: 90 tablet, Rfl: 0    topiramate (TOPAMAX) 25 MG tablet, Take 1 tablet by mouth Every Night., Disp: 90 tablet, Rfl: 1    traZODone (DESYREL) 50 MG tablet, Take 1 tablet by mouth every night at bedtime., Disp: 90 tablet, Rfl: 1    Vibegron 75 MG tablet, Take 1 tablet by mouth Daily., Disp: 30 tablet, Rfl: 11    Semaglutide-Weight Management 0.25 MG/0.5ML solution auto-injector, Inject 0.5 mL under the skin into the appropriate area as directed 1 (One) Time Per Week. After 4 weeks increase dose to the next unless excessive GI upset., Disp: 2 mL, Rfl: 0    Semaglutide-Weight Management 0.5 MG/0.5ML solution auto-injector, Inject 0.5 mL under the skin into the appropriate area as directed 1 (One) Time Per Week. After 4 weeks increase dose unless excessive GI upset., Disp: 2 mL, Rfl: 0    Semaglutide-Weight Management 1 MG/0.5ML solution auto-injector, Inject 0.5 mL under the skin into the appropriate area as directed 1 (One) Time Per Week. After 4 weeks increase to the next dose unless excessive GI upset, Disp: 2 mL, Rfl: 0    Semaglutide-Weight Management 1.7 MG/0.75ML solution auto-injector, Inject 0.75 mL under the skin into the appropriate area as directed 1 (One) Time Per Week. After 4 weeks increase to the next dose unless excessive GI upset, Disp: 3 mL, Rfl: 0    Semaglutide-Weight Management 2.4 MG/0.75ML solution auto-injector, Inject 0.75 mL under the skin into the appropriate area as directed 1 (One) Time Per Week., Disp: 3 mL, Rfl: 7    Objective     Vitals:    10/08/24 1337   BP: 122/80   BP Location: Left arm   Patient Position: Sitting   Cuff Size: Adult   Pulse: 53   Temp: 97.2 °F (36.2 °C)   TempSrc: Temporal   SpO2: 97%   Weight: 95 kg (209 lb 6.4 oz)   Height: 167.6 cm (66\")        Wt Readings from Last 3 Encounters:   10/08/24 95 kg (209 lb 6.4 oz)   06/21/24 97.9 kg (215 lb 12.8 oz)   04/23/24 95.3 kg " (210 lb)        BP Readings from Last 3 Encounters:   10/08/24 122/80   06/21/24 121/90   12/19/23 116/70        Physical Exam  Vital Signs  BMI is 33.8. Weight is 209 today.    Physical Exam  Vitals reviewed.   Constitutional:       General: She is not in acute distress.     Appearance: She is obese.   HENT:      Head: Normocephalic and atraumatic.   Pulmonary:      Effort: Pulmonary effort is normal.   Neurological:      General: No focal deficit present.      Mental Status: She is alert.   Psychiatric:         Attention and Perception: Attention normal.         Speech: Speech normal.         Thought Content: Thought content normal. Thought content does not include homicidal or suicidal ideation.       Result Review   The following data was reviewed by: SILVANA Rodriguez on 10/08/2024:  [x]  Tests & Results  []  Hospitalization/Emergency Department/Urgent Care  []  Internal/External Consultant Notes       Assessment and Plan     Diagnoses and all orders for this visit:    1. Moderate major depression (Primary)    2. Prediabetes    3. Class 1 obesity due to excess calories with serious comorbidity and body mass index (BMI) of 33.0 to 33.9 in adult    4. Essential hypertension    5. Hyperlipidemia, unspecified hyperlipidemia type    Other orders  -     Semaglutide-Weight Management 0.5 MG/0.5ML solution auto-injector; Inject 0.5 mL under the skin into the appropriate area as directed 1 (One) Time Per Week. After 4 weeks increase dose unless excessive GI upset.  Dispense: 2 mL; Refill: 0  -     Semaglutide-Weight Management 0.25 MG/0.5ML solution auto-injector; Inject 0.5 mL under the skin into the appropriate area as directed 1 (One) Time Per Week. After 4 weeks increase dose to the next unless excessive GI upset.  Dispense: 2 mL; Refill: 0  -     Semaglutide-Weight Management 1 MG/0.5ML solution auto-injector; Inject 0.5 mL under the skin into the appropriate area as directed 1 (One) Time Per Week. After 4  weeks increase to the next dose unless excessive GI upset  Dispense: 2 mL; Refill: 0  -     Semaglutide-Weight Management 1.7 MG/0.75ML solution auto-injector; Inject 0.75 mL under the skin into the appropriate area as directed 1 (One) Time Per Week. After 4 weeks increase to the next dose unless excessive GI upset  Dispense: 3 mL; Refill: 0  -     Semaglutide-Weight Management 2.4 MG/0.75ML solution auto-injector; Inject 0.75 mL under the skin into the appropriate area as directed 1 (One) Time Per Week.  Dispense: 3 mL; Refill: 7       Assessment & Plan  1. Depression.  She reports feeling very depressed, particularly due to her weight gain and concerns about her health.  Continue current prescribed medications.  I will send in Wegovy to help with weight loss.    2. Prediabetes.  Her A1c level was recorded as 6.2 on 03/31/2022, indicating prediabetes. The most recent A1c was 5.4, which is within the normal range.     3. Weight management.  She has experienced weight fluctuations, currently weighing 206. lbs, down from 215 lbs in June but up from 208 lbs in February. She was previously on medication that helped with weight loss but had to stop due to insurance coverage issues. A prescription for Wegovy will be sent to her pharmacy, starting with a dose of 0.25 mg. She is advised to follow the dosing schedule and use a co-pay card to potentially reduce the cost.    4. Hypertension.  She has a history of high blood pressure. She is advised weight management is crucial for controlling blood pressure.  Continue lisinopril 10 mg daily.    5. Hyperlipidemia.  She has a history of high cholesterol. She is advised  weight management is crucial for controlling cholesterol levels.  Continue atorvastatin 10 mg daily.       Patient was given instructions and counseling regarding her condition or for health maintenance advice. Please see specific information pulled into the AVS if appropriate.     Follow Up   Return for Next  scheduled follow up.    Patient or patient representative verbalized consent for the use of Ambient Listening during the visit with  SILVANA Rodriguez for chart documentation. 10/8/2024  13:53 EDT    SILVANA Rodriguez

## 2024-10-28 ENCOUNTER — TELEPHONE (OUTPATIENT)
Dept: INTERNAL MEDICINE | Age: 69
End: 2024-10-28
Payer: COMMERCIAL

## 2024-10-28 DIAGNOSIS — E66.811 CLASS 1 OBESITY DUE TO EXCESS CALORIES WITH SERIOUS COMORBIDITY AND BODY MASS INDEX (BMI) OF 33.0 TO 33.9 IN ADULT: Primary | ICD-10-CM

## 2024-10-28 DIAGNOSIS — E66.09 CLASS 1 OBESITY DUE TO EXCESS CALORIES WITH SERIOUS COMORBIDITY AND BODY MASS INDEX (BMI) OF 33.0 TO 33.9 IN ADULT: Primary | ICD-10-CM

## 2024-10-28 NOTE — TELEPHONE ENCOUNTER
Pt called requesting a pharmacy change for her medication that was out of stock but I need clarification on her dose and the pharmacy she requested so I left a vm

## 2024-12-15 DIAGNOSIS — E78.5 HYPERLIPIDEMIA, UNSPECIFIED HYPERLIPIDEMIA TYPE: Chronic | ICD-10-CM

## 2024-12-15 DIAGNOSIS — I10 ESSENTIAL HYPERTENSION: Chronic | ICD-10-CM

## 2024-12-15 NOTE — PROGRESS NOTES
Chief Complaint  Follow-up (The patient is coming in for a 6 month physical. The patient states she has no concerns. )    Subjective      History of Present Illness  The patient is a 69-year-old female who presents for her annual wellness and follow-up.    She has not yet received the shingles vaccine but plans to do so next month when she becomes eligible for Medicare. She has a history of chickenpox exposure during her childhood. She has expressed interest in receiving the influenza vaccine today. Her last tetanus vaccine was administered within the past decade, and she is considering another dose. She has declined the COVID-19 booster. She is currently on a weight loss regimen, having completed a course of Wegovy 0.5 mg. She reports an improvement in her appetite with this medication. She is also taking over-the-counter vitamin B12, D, and folic acid supplements. She is on Nexium 40 mg for reflux, which is working well. She is on fluoxetine for anxiety and depression. She is on trazodone for sleep, which helps with anxiety too. She is on bupropion, which she takes in the morning. She is on Tymlos prescribed by Dr. Barrios.    She has experienced weight loss, which she attributes to her current medication regimen. She is on a weight loss injection, Wegovy 0.5 mg, and has just finished it. She is going to start on 1 mg next month. She is going to get it filled next week before she goes on Medicare.    She has not undergone any recent blood work. Her most recent A1c level was recorded as 5.4.    She is on atorvastatin 10 mg. Her cholesterol levels were checked in June and were normal.    MEDICATIONS  Current: atorvastatin, Nexium, fluoxetine, trazodone, bupropion, Tymlos, Wegovy.    IMMUNIZATIONS  She has not received the shingles vaccine. She has received a tetanus vaccine within the last 10 years.       Past Medical History:   Diagnosis Date    Anxiety 2005    Arthritis 2018    Cervical disc disorder 2020     Depression     Disorder of white blood cells     DENIES ANY CURRENT ISSUES    Essential (primary) hypertension     Gastro-esophageal reflux disease without esophagitis     Hypercholesterolemia     Hyperlipidemia, unspecified     Low back pain     Lumbar spondylosis     Lumbosacral disc disease     Obesity 2016    Pain management     Stage 3 chronic kidney disease     Urinary tract infection     Vitamin D deficiency         Past Surgical History:   Procedure Laterality Date    COLONOSCOPY  2019    CYSTOSCOPY BLADDER BIOPSY Bilateral 2023    Procedure: CYSTOSCOPY BLADDER BIOPSY AND FULGERATION,BILATERAL RETROGRADE PYLEOGRAM;  Surgeon: Bartolo Barrios MD;  Location: [facility] OR;  Service: Urology;  Laterality: Bilateral;    EPIDURAL BLOCK      HYSTERECTOMY          Social History     Tobacco Use   Smoking Status Former    Current packs/day: 0.00    Average packs/day: 0.5 packs/day for 40.0 years (20.0 ttl pk-yrs)    Types: Cigarettes, Electronic Cigarette    Start date: 1970    Quit date:     Years since quittin.9   Smokeless Tobacco Never        Patient Care Team:  Paulette Hernanedz APRN as PCP - General (Nurse Practitioner)  Sue Bernal APRN as Nurse Practitioner (Urology)  Bartolo Barrios MD as Consulting Physician (Urology)  Ashkan Blackmon Jr., MD as Consulting Physician (Pain Medicine)    Allergies   Allergen Reactions    Promethazine Other (See Comments)     Family history of hallucinations when using it. PT REPORTS HAS NEVER HAD REACTION TO IT BUT SEVERAL FAMILY MEMBERS HAVE           Current Outpatient Medications:     atorvastatin (LIPITOR) 10 MG tablet, TAKE 1 TABLET BY MOUTH EVERY NIGHT, Disp: 90 tablet, Rfl: 0    buPROPion XL (WELLBUTRIN XL) 300 MG 24 hr tablet, Take 1 tablet by mouth Every Morning., Disp: 90 tablet, Rfl: 1    esomeprazole (nexIUM) 40 MG capsule, Take 1 capsule by mouth Every Morning Before Breakfast., Disp: 90 capsule, Rfl: 1     estradiol (ESTRACE) 0.1 MG/GM vaginal cream, INSERT 1 GRAM VAGINALLY 2 TIMES A WEEK, Disp: , Rfl:     FLUoxetine (PROzac) 40 MG capsule, TAKE 1 CAPSULE BY MOUTH DAILY, Disp: 90 capsule, Rfl: 0    gabapentin (NEURONTIN) 300 MG capsule, Take 1 capsule by mouth 3 (Three) Times a Day As Needed., Disp: , Rfl:     HYDROcodone-acetaminophen (NORCO) 5-325 MG per tablet, Take 1 tablet by mouth Every 6 (Six) Hours As Needed., Disp: , Rfl:     lisinopril (PRINIVIL,ZESTRIL) 10 MG tablet, TAKE 1 TABLET BY MOUTH DAILY, Disp: 90 tablet, Rfl: 0    Semaglutide-Weight Management 0.5 MG/0.5ML solution auto-injector, Inject 0.5 mL under the skin into the appropriate area as directed 1 (One) Time Per Week. After 4 weeks increase dose unless excessive GI upset., Disp: 2 mL, Rfl: 0    Semaglutide-Weight Management 1 MG/0.5ML solution auto-injector, Inject 0.5 mL under the skin into the appropriate area as directed 1 (One) Time Per Week. After 4 weeks increase to the next dose unless excessive GI upset, Disp: 2 mL, Rfl: 0    Semaglutide-Weight Management 1.7 MG/0.75ML solution auto-injector, Inject 0.75 mL under the skin into the appropriate area as directed 1 (One) Time Per Week. After 4 weeks increase to the next dose unless excessive GI upset, Disp: 3 mL, Rfl: 0    Semaglutide-Weight Management 2.4 MG/0.75ML solution auto-injector, Inject 0.75 mL under the skin into the appropriate area as directed 1 (One) Time Per Week., Disp: 3 mL, Rfl: 7    topiramate (TOPAMAX) 25 MG tablet, Take 1 tablet by mouth Every Night., Disp: 90 tablet, Rfl: 1    traZODone (DESYREL) 50 MG tablet, Take 1 tablet by mouth every night at bedtime., Disp: 90 tablet, Rfl: 1    Vibegron 75 MG tablet, Take 1 tablet by mouth Daily., Disp: 30 tablet, Rfl: 11    Objective     Vitals:    12/20/24 1551   BP: 115/80   BP Location: Right arm   Patient Position: Sitting   Cuff Size: Large Adult   Pulse: 89   Temp: 96.9 °F (36.1 °C)   TempSrc: Temporal   SpO2: 98%   Weight:  "96.1 kg (211 lb 12.8 oz)   Height: 167.6 cm (66\")        Wt Readings from Last 3 Encounters:   12/20/24 96.1 kg (211 lb 12.8 oz)   10/08/24 95 kg (209 lb 6.4 oz)   06/21/24 97.9 kg (215 lb 12.8 oz)        BP Readings from Last 3 Encounters:   12/20/24 115/80   10/08/24 122/80   06/21/24 121/90          Physical Exam  Vitals reviewed.   Constitutional:       General: She is not in acute distress.  HENT:      Head: Normocephalic and atraumatic.   Eyes:      Conjunctiva/sclera: Conjunctivae normal.   Cardiovascular:      Rate and Rhythm: Normal rate and regular rhythm.      Heart sounds: Normal heart sounds.   Pulmonary:      Effort: Pulmonary effort is normal.      Breath sounds: Normal breath sounds. No wheezing, rhonchi or rales.   Abdominal:      General: There is no distension.      Palpations: Abdomen is soft. There is no mass.      Tenderness: There is no abdominal tenderness.   Musculoskeletal:      Right lower leg: No edema.      Left lower leg: No edema.   Lymphadenopathy:      Cervical: No cervical adenopathy.   Skin:     General: Skin is warm and dry.   Neurological:      General: No focal deficit present.      Mental Status: She is alert.   Psychiatric:         Mood and Affect: Mood normal.         Thought Content: Thought content normal.                Result Review   The following data was reviewed by: SILVANA Rodriguez on 12/20/2024:  [x]  Tests & Results  []  Hospitalization/Emergency Department/Urgent Care  []  Internal/External Consultant Notes       Assessment and Plan     Diagnoses and all orders for this visit:    1. Annual physical exam (Primary)    2. Class 1 obesity due to excess calories with serious comorbidity and body mass index (BMI) of 34.0 to 34.9 in adult    3. Prediabetes    4. Hyperlipidemia, unspecified hyperlipidemia type    5. Essential hypertension    6. Gastro-esophageal reflux disease without esophagitis    7. Anxiety and depression    8. Insomnia, unspecified type    9. " Vitamin D deficiency    10. Folic acid deficiency    11. Need for influenza vaccination  -     Fluzone High-Dose 65+yrs    12. Postmenopause  -     DEXA Bone Density Axial; Future    13. Encounter for screening mammogram for malignant neoplasm of breast  -     Mammo Screening Digital Tomosynthesis Bilateral With CAD; Future         Assessment & Plan  1. Annual wellness visit.  Last labs done were reviewed.  Her A1c levels have returned to normal, indicating an improvement in her prediabetic condition. All other lab results are within normal limits. Her cholesterol levels were last assessed in June and were found to be satisfactory. She is on Gemtesa prescribed by Dr. Barrios. She will receive the influenza vaccine today.  She agrees to get the shingles 2 doses at the pharmacy.  An order for a bone scan has been placed to assess for potential osteoporosis. A mammogram has been scheduled for 07/02/2025.    2. Weight management.  She has experienced a slight decrease in weight from 215 in June to 211 today, although it was 209 in October. She is currently on weight loss injections, specifically Wegovy 0.5 mg, and has just finished this dose. She will be transitioning to 1 mg next month. She reports that her appetite has changed positively with the current dosage.     3. Prediabetes.  Her A1c levels have returned to normal, indicating an improvement in her prediabetic condition. This improvement is attributed to her weight loss.    4. Cholesterol management.  She is currently on atorvastatin 10 mg. Her cholesterol levels were last assessed in June and were found to be satisfactory. A recheck of her cholesterol levels will be conducted in 6 months.    5.  Hypertension.  Her blood pressure readings are within the normal range at 115/80. Blood pressure well-controlled on lisinopril 10 mg daily and to continue.     6.  GERD.  Stable on Nexium 40 mg daily to continue.    7.  Anxiety and depression.  Stable on bupropion 300  mg daily and fluoxetine 40 mg daily and to continue.    8.  Insomnia.  Stable on trazodone 50 mg at bedtime and to continue.    9.  Vitamin D deficiency and folic acid deficiency.  The patient is taking a multivitamin which contains folic acid and vitamin D.  Continue current medication.    Patient was given instructions and counseling regarding her condition or for health maintenance advice. Please see specific information pulled into the AVS if appropriate.     Follow Up   Return in about 6 months (around 6/20/2025) for Medicare Wellness-welcome to visit.    Patient or patient representative verbalized consent for the use of Ambient Listening during the visit with  SILVANA Rodriguez for chart documentation. 12/20/2024  16:15 EST    SILVANA Rodriguez

## 2024-12-16 RX ORDER — ATORVASTATIN CALCIUM 10 MG/1
10 TABLET, FILM COATED ORAL NIGHTLY
Qty: 90 TABLET | Refills: 0 | Status: SHIPPED | OUTPATIENT
Start: 2024-12-16

## 2024-12-16 RX ORDER — LISINOPRIL 10 MG/1
10 TABLET ORAL DAILY
Qty: 90 TABLET | Refills: 0 | Status: SHIPPED | OUTPATIENT
Start: 2024-12-16

## 2024-12-20 ENCOUNTER — OFFICE VISIT (OUTPATIENT)
Dept: INTERNAL MEDICINE | Age: 69
End: 2024-12-20
Payer: COMMERCIAL

## 2024-12-20 VITALS
SYSTOLIC BLOOD PRESSURE: 115 MMHG | HEART RATE: 89 BPM | HEIGHT: 66 IN | WEIGHT: 211.8 LBS | DIASTOLIC BLOOD PRESSURE: 80 MMHG | OXYGEN SATURATION: 98 % | TEMPERATURE: 96.9 F | BODY MASS INDEX: 34.04 KG/M2

## 2024-12-20 DIAGNOSIS — Z78.0 POSTMENOPAUSE: ICD-10-CM

## 2024-12-20 DIAGNOSIS — E53.8 FOLIC ACID DEFICIENCY: ICD-10-CM

## 2024-12-20 DIAGNOSIS — Z23 NEED FOR INFLUENZA VACCINATION: ICD-10-CM

## 2024-12-20 DIAGNOSIS — R73.03 PREDIABETES: Chronic | ICD-10-CM

## 2024-12-20 DIAGNOSIS — K21.9 GASTRO-ESOPHAGEAL REFLUX DISEASE WITHOUT ESOPHAGITIS: Chronic | ICD-10-CM

## 2024-12-20 DIAGNOSIS — F41.9 ANXIETY AND DEPRESSION: Chronic | ICD-10-CM

## 2024-12-20 DIAGNOSIS — E78.5 HYPERLIPIDEMIA, UNSPECIFIED HYPERLIPIDEMIA TYPE: Chronic | ICD-10-CM

## 2024-12-20 DIAGNOSIS — E66.811 CLASS 1 OBESITY DUE TO EXCESS CALORIES WITH SERIOUS COMORBIDITY AND BODY MASS INDEX (BMI) OF 34.0 TO 34.9 IN ADULT: ICD-10-CM

## 2024-12-20 DIAGNOSIS — Z00.00 ANNUAL PHYSICAL EXAM: Primary | ICD-10-CM

## 2024-12-20 DIAGNOSIS — F32.A ANXIETY AND DEPRESSION: Chronic | ICD-10-CM

## 2024-12-20 DIAGNOSIS — G47.00 INSOMNIA, UNSPECIFIED TYPE: ICD-10-CM

## 2024-12-20 DIAGNOSIS — I10 ESSENTIAL HYPERTENSION: Chronic | ICD-10-CM

## 2024-12-20 DIAGNOSIS — Z12.31 ENCOUNTER FOR SCREENING MAMMOGRAM FOR MALIGNANT NEOPLASM OF BREAST: ICD-10-CM

## 2024-12-20 DIAGNOSIS — E55.9 VITAMIN D DEFICIENCY: ICD-10-CM

## 2024-12-20 DIAGNOSIS — E66.09 CLASS 1 OBESITY DUE TO EXCESS CALORIES WITH SERIOUS COMORBIDITY AND BODY MASS INDEX (BMI) OF 34.0 TO 34.9 IN ADULT: ICD-10-CM

## 2024-12-30 ENCOUNTER — TELEPHONE (OUTPATIENT)
Dept: INTERNAL MEDICINE | Age: 69
End: 2024-12-30
Payer: COMMERCIAL

## 2024-12-30 DIAGNOSIS — E66.811 CLASS 1 OBESITY DUE TO EXCESS CALORIES WITH SERIOUS COMORBIDITY AND BODY MASS INDEX (BMI) OF 33.0 TO 33.9 IN ADULT: ICD-10-CM

## 2024-12-30 DIAGNOSIS — E66.09 CLASS 1 OBESITY DUE TO EXCESS CALORIES WITH SERIOUS COMORBIDITY AND BODY MASS INDEX (BMI) OF 33.0 TO 33.9 IN ADULT: ICD-10-CM

## 2024-12-30 NOTE — TELEPHONE ENCOUNTER
Caller: Zahira Perea    Relationship to patient: Self    Best call back number: [phone]    Patient is needing: PATIENT CALLED IN AND IS REQUESTING A CALL BACK WITH GUIDANCE. PATIENT SAID THAT SHE IS NEEDING A REFILL ON Semaglutide-Weight Management  BUT [facility] HAVE THE STEP UP DOSE AND PATIENT WOULD LIKE TO KNOW IF SHE CAN STAY ON THE DOSE SHE IS CURRENTLY TAKING UNTIL THEY GET THE HIGH DOSE IN STOCK. PATIENT IS REQUESTING A CALL BACK PLEASE.        [id number] - [place] [address] AT [facility] - [phone]  - [phone]  [phone]

## 2025-01-07 RX ORDER — TRAZODONE HYDROCHLORIDE 50 MG/1
50 TABLET, FILM COATED ORAL
Qty: 90 TABLET | Refills: 1 | Status: SHIPPED | OUTPATIENT
Start: 2025-01-07

## 2025-01-13 RX ORDER — BUPROPION HYDROCHLORIDE 300 MG/1
300 TABLET ORAL EVERY MORNING
Qty: 90 TABLET | Refills: 1 | Status: SHIPPED | OUTPATIENT
Start: 2025-01-13

## 2025-02-28 ENCOUNTER — LAB (OUTPATIENT)
Dept: LAB | Facility: HOSPITAL | Age: 70
End: 2025-02-28
Payer: MEDICARE

## 2025-02-28 DIAGNOSIS — E55.9 VITAMIN D DEFICIENCY: ICD-10-CM

## 2025-02-28 DIAGNOSIS — R73.03 PREDIABETES: ICD-10-CM

## 2025-02-28 DIAGNOSIS — K21.9 GASTRO-ESOPHAGEAL REFLUX DISEASE WITHOUT ESOPHAGITIS: ICD-10-CM

## 2025-02-28 DIAGNOSIS — I10 ESSENTIAL HYPERTENSION: ICD-10-CM

## 2025-02-28 DIAGNOSIS — E78.5 HYPERLIPIDEMIA, UNSPECIFIED HYPERLIPIDEMIA TYPE: ICD-10-CM

## 2025-02-28 LAB
25(OH)D3 SERPL-MCNC: 33.5 NG/ML (ref 30–100)
ALBUMIN SERPL-MCNC: 4.4 G/DL (ref 3.5–5.2)
ALBUMIN/GLOB SERPL: 1.7 G/DL
ALP SERPL-CCNC: 89 U/L (ref 39–117)
ALT SERPL W P-5'-P-CCNC: 17 U/L (ref 1–33)
ANION GAP SERPL CALCULATED.3IONS-SCNC: 10 MMOL/L (ref 5–15)
AST SERPL-CCNC: 19 U/L (ref 1–32)
BASOPHILS # BLD MANUAL: 0 10*3/MM3 (ref 0–0.2)
BASOPHILS NFR BLD MANUAL: 0 % (ref 0–1.5)
BILIRUB SERPL-MCNC: 0.4 MG/DL (ref 0–1.2)
BUN SERPL-MCNC: 13 MG/DL (ref 8–23)
BUN/CREAT SERPL: 10.3 (ref 7–25)
CALCIUM SPEC-SCNC: 10.1 MG/DL (ref 8.6–10.5)
CHLORIDE SERPL-SCNC: 103 MMOL/L (ref 98–107)
CHOLEST SERPL-MCNC: 224 MG/DL (ref 0–200)
CO2 SERPL-SCNC: 26 MMOL/L (ref 22–29)
CREAT SERPL-MCNC: 1.26 MG/DL (ref 0.57–1)
DEPRECATED RDW RBC AUTO: 42.6 FL (ref 37–54)
EGFRCR SERPLBLD CKD-EPI 2021: 46 ML/MIN/1.73
EOSINOPHIL # BLD MANUAL: 0.22 10*3/MM3 (ref 0–0.4)
EOSINOPHIL NFR BLD MANUAL: 3 % (ref 0.3–6.2)
ERYTHROCYTE [DISTWIDTH] IN BLOOD BY AUTOMATED COUNT: 12.7 % (ref 12.3–15.4)
FOLATE SERPL-MCNC: 18.1 NG/ML (ref 4.78–24.2)
GLOBULIN UR ELPH-MCNC: 2.6 GM/DL
GLUCOSE SERPL-MCNC: 87 MG/DL (ref 65–99)
HBA1C MFR BLD: 5.4 % (ref 4.8–5.6)
HCT VFR BLD AUTO: 38.4 % (ref 34–46.6)
HDLC SERPL-MCNC: 45 MG/DL (ref 40–60)
HGB BLD-MCNC: 13.1 G/DL (ref 12–15.9)
LDLC SERPL CALC-MCNC: 157 MG/DL (ref 0–100)
LDLC/HDLC SERPL: 3.44 {RATIO}
LYMPHOCYTES # BLD MANUAL: 4.49 10*3/MM3 (ref 0.7–3.1)
LYMPHOCYTES NFR BLD MANUAL: 4 % (ref 5–12)
MAGNESIUM SERPL-MCNC: 2.2 MG/DL (ref 1.6–2.4)
MCH RBC QN AUTO: 31.6 PG (ref 26.6–33)
MCHC RBC AUTO-ENTMCNC: 34.1 G/DL (ref 31.5–35.7)
MCV RBC AUTO: 92.5 FL (ref 79–97)
MONOCYTES # BLD: 0.29 10*3/MM3 (ref 0.1–0.9)
NEUTROPHILS # BLD AUTO: 2.36 10*3/MM3 (ref 1.7–7)
NEUTROPHILS NFR BLD MANUAL: 32 % (ref 42.7–76)
NRBC BLD AUTO-RTO: 0 /100 WBC (ref 0–0.2)
PLAT MORPH BLD: NORMAL
PLATELET # BLD AUTO: 351 10*3/MM3 (ref 140–450)
PMV BLD AUTO: 10.1 FL (ref 6–12)
POTASSIUM SERPL-SCNC: 4.4 MMOL/L (ref 3.5–5.2)
PROT SERPL-MCNC: 7 G/DL (ref 6–8.5)
RBC # BLD AUTO: 4.15 10*6/MM3 (ref 3.77–5.28)
RBC MORPH BLD: NORMAL
SMUDGE CELLS BLD QL SMEAR: ABNORMAL
SODIUM SERPL-SCNC: 139 MMOL/L (ref 136–145)
TRIGL SERPL-MCNC: 122 MG/DL (ref 0–150)
TSH SERPL DL<=0.05 MIU/L-ACNC: 1.68 UIU/ML (ref 0.27–4.2)
VARIANT LYMPHS NFR BLD MANUAL: 1 % (ref 0–5)
VARIANT LYMPHS NFR BLD MANUAL: 60 % (ref 19.6–45.3)
VIT B12 BLD-MCNC: 204 PG/ML (ref 211–946)
VLDLC SERPL-MCNC: 22 MG/DL (ref 5–40)
WBC NRBC COR # BLD AUTO: 7.36 10*3/MM3 (ref 3.4–10.8)

## 2025-02-28 PROCEDURE — 82607 VITAMIN B-12: CPT

## 2025-02-28 PROCEDURE — 83735 ASSAY OF MAGNESIUM: CPT

## 2025-02-28 PROCEDURE — 80061 LIPID PANEL: CPT

## 2025-02-28 PROCEDURE — 84443 ASSAY THYROID STIM HORMONE: CPT

## 2025-02-28 PROCEDURE — 85025 COMPLETE CBC W/AUTO DIFF WBC: CPT

## 2025-02-28 PROCEDURE — 83036 HEMOGLOBIN GLYCOSYLATED A1C: CPT

## 2025-02-28 PROCEDURE — 82746 ASSAY OF FOLIC ACID SERUM: CPT

## 2025-02-28 PROCEDURE — 36415 COLL VENOUS BLD VENIPUNCTURE: CPT

## 2025-02-28 PROCEDURE — 85007 BL SMEAR W/DIFF WBC COUNT: CPT

## 2025-02-28 PROCEDURE — 82306 VITAMIN D 25 HYDROXY: CPT

## 2025-02-28 PROCEDURE — 80053 COMPREHEN METABOLIC PANEL: CPT

## 2025-03-10 DIAGNOSIS — I10 ESSENTIAL HYPERTENSION: Chronic | ICD-10-CM

## 2025-03-10 DIAGNOSIS — E78.5 HYPERLIPIDEMIA, UNSPECIFIED HYPERLIPIDEMIA TYPE: Chronic | ICD-10-CM

## 2025-03-10 RX ORDER — LISINOPRIL 10 MG/1
10 TABLET ORAL DAILY
Qty: 90 TABLET | Refills: 0 | Status: SHIPPED | OUTPATIENT
Start: 2025-03-10

## 2025-03-10 RX ORDER — ATORVASTATIN CALCIUM 10 MG/1
10 TABLET, FILM COATED ORAL NIGHTLY
Qty: 90 TABLET | Refills: 0 | Status: SHIPPED | OUTPATIENT
Start: 2025-03-10 | End: 2025-03-14

## 2025-03-11 NOTE — PROGRESS NOTES
Answers submitted by the patient for this visit:  Problem not listed (Submitted on 3/7/2025)  Chief Complaint: Other medical problem  Reason for appointment: Bloodwork results and weight management  abdominal pain: No  anorexia: No  joint pain: No  change in stool: Yes  chest pain: No  chills: No  nasal congestion: No  cough: No  diaphoresis: No  fatigue: No  fever: No  headaches: No  joint swelling: No  myalgias: No  nausea: No  neck pain: No  numbness: No  rash: No  sore throat: No  swollen glands: No  dysuria: No  vertigo: No  visual change: No  vomiting: No  weakness: No  Onset: 1 to 5 years  Chronicity: chronic  Frequency: weekly  Medications tried: Ozempic Zepbound Wegovy  Chief Complaint  Labs Only (Wants to discuss going on medication for weight loss. )    Subjective      History of Present Illness  The patient is a 70-year-old female who presents for evaluation of chronic kidney disease,  constipation, hypercholesterolemia, and vitamin B12 deficiency.    She has been experiencing dysuria, which she attributes to her low GFR. However, she reports a significant improvement in her symptoms over the past 2 days, with no need for Azo or analgesics. She has a scheduled follow-up appointment with her urologist in 04/2025. The urologist suggested that her dysuria might be related to constipation.    Her current bowel regimen includes 2 stool softeners nightly, and if ineffective, she resorts to laxatives, sometimes requiring up to 3 doses. She did not take a laxative today but took 3 yesterday. Her hydration primarily consists of water, consuming approximately a case per week. She also enjoys a Coke float for supper. She expresses concern about her inability to walk extensively. She has considered resuming Linzess and has purchased Dulcolax, taking 4 to 6 tablets as needed. She has previously self-administered injections for weight management and is confident in her ability to do so again.    She has been on  atorvastatin for an extended period and has been compliant with the medication. She reports experiencing leg cramps and foot spasms, which she believes are due to her back condition rather than the cholesterol medication. She has scheduled injections for her legs and feet on 2025. She has been receiving bilateral injections for her back and ablations, which are covered by Medicare.    She has not been taking vitamin B12 supplements as she was unaware of the need. She continues to take a multivitamin twice daily.    She is on Nexium.    SOCIAL HISTORY  She does not drink alcohol.    FAMILY HISTORY  She has a first cousin who  from chronic kidney failure.       Past Medical History:   Diagnosis Date    Anxiety 2005    Arthritis 2018    Cervical disc disorder     Depression     Disorder of white blood cells     DENIES ANY CURRENT ISSUES    Essential (primary) hypertension     Gastro-esophageal reflux disease without esophagitis     Hypercholesterolemia     Hyperlipidemia, unspecified     Low back pain     Lumbar spondylosis     Lumbosacral disc disease 2020    Obesity 2016    Pain management     Stage 3 chronic kidney disease     Urinary tract infection     Vitamin D deficiency         Past Surgical History:   Procedure Laterality Date    COLONOSCOPY  2019    CYSTOSCOPY BLADDER BIOPSY Bilateral 2023    Procedure: CYSTOSCOPY BLADDER BIOPSY AND FULGERATION,BILATERAL RETROGRADE PYLEOGRAM;  Surgeon: Bartolo Barrios MD;  Location: [facility] MA[facility] OR;  Service: Urology;  Laterality: Bilateral;    EPIDURAL BLOCK      HYSTERECTOMY          Social History     Tobacco Use   Smoking Status Former    Current packs/day: 0.00    Average packs/day: 0.5 packs/day for 40.0 years (20.0 ttl pk-yrs)    Types: Cigarettes, Electronic Cigarette    Start date: 1970    Quit date: 2010    Years since quitting: 15.2   Smokeless Tobacco Never        Patient Care Team:  Paulette Hernandez APRN as PCP - General  (Nurse Practitioner)  Sue Bernal APRN as Nurse Practitioner (Urology)  Bartolo Barrios MD as Consulting Physician (Urology)  Ashkan Blackmon Jr., MD as Consulting Physician (Pain Medicine)    Allergies   Allergen Reactions    Promethazine Other (See Comments)     Family history of hallucinations when using it. PT REPORTS HAS NEVER HAD REACTION TO IT BUT SEVERAL FAMILY MEMBERS HAVE           Current Outpatient Medications:     buPROPion XL (WELLBUTRIN XL) 300 MG 24 hr tablet, TAKE 1 TABLET BY MOUTH EVERY MORNING, Disp: 90 tablet, Rfl: 1    esomeprazole (nexIUM) 40 MG capsule, Take 1 capsule by mouth Every Morning Before Breakfast., Disp: 90 capsule, Rfl: 1    estradiol (ESTRACE) 0.1 MG/GM vaginal cream, INSERT 1 GRAM VAGINALLY 2 TIMES A WEEK, Disp: , Rfl:     FLUoxetine (PROzac) 40 MG capsule, TAKE 1 CAPSULE BY MOUTH DAILY, Disp: 90 capsule, Rfl: 0    gabapentin (NEURONTIN) 300 MG capsule, Take 1 capsule by mouth 3 (Three) Times a Day As Needed., Disp: , Rfl:     HYDROcodone-acetaminophen (NORCO) 5-325 MG per tablet, Take 1 tablet by mouth Every 6 (Six) Hours As Needed., Disp: , Rfl:     lisinopril (PRINIVIL,ZESTRIL) 10 MG tablet, TAKE 1 TABLET BY MOUTH DAILY, Disp: 90 tablet, Rfl: 0    topiramate (TOPAMAX) 25 MG tablet, Take 1 tablet by mouth Every Night., Disp: 90 tablet, Rfl: 1    traZODone (DESYREL) 50 MG tablet, Take 1 tablet by mouth every night at bedtime., Disp: 90 tablet, Rfl: 1    Vibegron 75 MG tablet, Take 1 tablet by mouth Daily., Disp: 30 tablet, Rfl: 11    atorvastatin (LIPITOR) 20 MG tablet, Take 1 tablet by mouth Every Night., Disp: 90 tablet, Rfl: 1    cyanocobalamin 1000 MCG/ML injection, Inject 1 mL under the skin into the appropriate area as directed 1 (One) Time Per Week. Start with 1 mL under the skin into the appropriate area as directed every 7 days x 4 then every 28 days., Disp: 4 mL, Rfl: 3    Current Facility-Administered Medications:     cyanocobalamin injection 1,000  "mcg, 1,000 mcg, Intramuscular, Q28 Days, Paulette Hernandez APRN, 1,000 mcg at 03/14/25 1505    Objective     Vitals:    03/14/25 1337   BP: 110/70   BP Location: Right arm   Patient Position: Sitting   Cuff Size: Large Adult   Pulse: 90   Resp: 16   Temp: 96.8 °F (36 °C)   TempSrc: Temporal   SpO2: 90%   Weight: 92.8 kg (204 lb 9.6 oz)   Height: 170.2 cm (67\")        Wt Readings from Last 3 Encounters:   03/14/25 92.8 kg (204 lb 9.6 oz)   12/20/24 96.1 kg (211 lb 12.8 oz)   10/08/24 95 kg (209 lb 6.4 oz)        BP Readings from Last 3 Encounters:   03/14/25 110/70   12/20/24 115/80   10/08/24 122/80        Physical Exam  Vital Signs  Blood pressure is normal.    Physical Exam  Vitals reviewed.   Constitutional:       General: She is not in acute distress.  HENT:      Head: Normocephalic and atraumatic.   Pulmonary:      Effort: Pulmonary effort is normal.   Neurological:      General: No focal deficit present.      Mental Status: She is alert.   Psychiatric:         Thought Content: Thought content normal.                Result Review   The following data was reviewed by: SILVANA Rodriguez on 03/14/2025:  [x]  Tests & Results  []  Hospitalization/Emergency Department/Urgent Care  [x]  Internal/External Consultant Notes       Assessment and Plan     Diagnoses and all orders for this visit:    1. Stage 3a chronic kidney disease (Primary)    2. Dysuria    3. Chronic constipation    4. Hyperlipidemia, unspecified hyperlipidemia type  -     Lipid Panel; Future    5. Vitamin B 12 deficiency  -     Vitamin B12; Future  -     Folate; Future  -     cyanocobalamin injection 1,000 mcg    6. Essential hypertension  -     Comprehensive Metabolic Panel; Future  -     CBC & Differential; Future  -     TSH Rfx On Abnormal To Free T4; Future  -     Magnesium; Future    7. Prediabetes  -     Hemoglobin A1c; Future    8. Vitamin D deficiency  -     Vitamin D,25-Hydroxy; Future    Other orders  -     atorvastatin (LIPITOR) 20 MG " tablet; Take 1 tablet by mouth Every Night.  Dispense: 90 tablet; Refill: 1  -     cyanocobalamin 1000 MCG/ML injection; Inject 1 mL under the skin into the appropriate area as directed 1 (One) Time Per Week. Start with 1 mL under the skin into the appropriate area as directed every 7 days x 4 then every 28 days.  Dispense: 4 mL; Refill: 3       Assessment & Plan  1. Chronic Kidney Disease.  Her GFR is 46, indicating chronic kidney disease. She is currently on lisinopril, which is appropriate for her condition. She is advised to avoid NSAIDs such as ibuprofen and Aleve. If her GFR worsens significantly, a referral to a nephrologist will be considered.    2. Burning Urination.  She reports burning urination, which may be related to her constipation. She is advised to continue her current regimen of stool softeners and laxatives as needed. She has a follow-up with her urologist in April, who will further evaluate the correlation between her symptoms and her kidney function.    3. Constipation.  She is currently taking two stool softeners every night and uses laxatives if needed. She is advised to continue this regimen and increase her water intake. If symptoms persist, she may consider discussing Linzess with her healthcare provider.    4. Hypercholesterolemia.  Her cholesterol levels are elevated despite being on atorvastatin. The dosage of atorvastatin will be increased. She is advised to monitor for any side effects such as muscle aches or cramps and report any issues.    5. Vitamin B12 Deficiency.  Her B12 levels are low, which can cause fatigue. She is not anemic, but her B12 levels have consistently been in the 200s, now at 204. She will receive a B12 injection today. She is advised to continue with weekly B12 injections for a month, followed by monthly injections. She can also consider sublingual B12 supplements.    6.  Hypertension.  Blood pressure well-controlled on lisinopril 10 mg daily and to  continue.    Follow-up  The patient will follow up in 6 months.    PROCEDURE  The patient has been receiving bilateral injections for her back and ablations.    Patient was given instructions and counseling regarding her condition or for health maintenance advice. Please see specific information pulled into the AVS if appropriate.     Follow Up   Return in about 6 months (around 9/14/2025) for Welcome to Medicare Wellness.    Patient or patient representative verbalized consent for the use of Ambient Listening during the visit with  SILVANA Rodriguez for chart documentation. 3/14/2025  13:50 EDT    SILVANA Rodriguez

## 2025-03-13 RX ORDER — TOPIRAMATE 25 MG/1
25 TABLET, FILM COATED ORAL NIGHTLY
Qty: 90 TABLET | Refills: 1 | Status: SHIPPED | OUTPATIENT
Start: 2025-03-13

## 2025-03-14 ENCOUNTER — OFFICE VISIT (OUTPATIENT)
Dept: INTERNAL MEDICINE | Age: 70
End: 2025-03-14
Payer: MEDICARE

## 2025-03-14 VITALS
HEART RATE: 90 BPM | BODY MASS INDEX: 32.11 KG/M2 | SYSTOLIC BLOOD PRESSURE: 110 MMHG | WEIGHT: 204.6 LBS | DIASTOLIC BLOOD PRESSURE: 70 MMHG | OXYGEN SATURATION: 90 % | TEMPERATURE: 96.8 F | RESPIRATION RATE: 16 BRPM | HEIGHT: 67 IN

## 2025-03-14 DIAGNOSIS — E53.8 VITAMIN B 12 DEFICIENCY: ICD-10-CM

## 2025-03-14 DIAGNOSIS — I10 ESSENTIAL HYPERTENSION: Chronic | ICD-10-CM

## 2025-03-14 DIAGNOSIS — R73.03 PREDIABETES: ICD-10-CM

## 2025-03-14 DIAGNOSIS — R30.0 DYSURIA: ICD-10-CM

## 2025-03-14 DIAGNOSIS — E78.5 HYPERLIPIDEMIA, UNSPECIFIED HYPERLIPIDEMIA TYPE: Chronic | ICD-10-CM

## 2025-03-14 DIAGNOSIS — E55.9 VITAMIN D DEFICIENCY: ICD-10-CM

## 2025-03-14 DIAGNOSIS — N18.31 STAGE 3A CHRONIC KIDNEY DISEASE: Primary | Chronic | ICD-10-CM

## 2025-03-14 DIAGNOSIS — K59.09 CHRONIC CONSTIPATION: ICD-10-CM

## 2025-03-14 RX ORDER — CYANOCOBALAMIN 1000 UG/ML
1000 INJECTION, SOLUTION INTRAMUSCULAR; SUBCUTANEOUS
Status: SHIPPED | OUTPATIENT
Start: 2025-03-14

## 2025-03-14 RX ORDER — ATORVASTATIN CALCIUM 20 MG/1
20 TABLET, FILM COATED ORAL NIGHTLY
Qty: 90 TABLET | Refills: 1 | Status: SHIPPED | OUTPATIENT
Start: 2025-03-14

## 2025-03-14 RX ORDER — CYANOCOBALAMIN 1000 UG/ML
1000 INJECTION, SOLUTION INTRAMUSCULAR; SUBCUTANEOUS WEEKLY
Qty: 4 ML | Refills: 3 | Status: SHIPPED | OUTPATIENT
Start: 2025-03-14

## 2025-03-14 RX ADMIN — CYANOCOBALAMIN 1000 MCG: 1000 INJECTION, SOLUTION INTRAMUSCULAR; SUBCUTANEOUS at 15:05

## 2025-04-22 DIAGNOSIS — F33.0 MILD RECURRENT MAJOR DEPRESSION: ICD-10-CM

## 2025-04-22 RX ORDER — CYANOCOBALAMIN 1000 UG/ML
1000 INJECTION, SOLUTION INTRAMUSCULAR; SUBCUTANEOUS WEEKLY
Qty: 4 ML | Refills: 3 | Status: SHIPPED | OUTPATIENT
Start: 2025-04-22

## 2025-04-22 RX ORDER — FLUOXETINE HYDROCHLORIDE 40 MG/1
40 CAPSULE ORAL DAILY
Qty: 90 CAPSULE | Refills: 0 | Status: SHIPPED | OUTPATIENT
Start: 2025-04-22

## 2025-04-23 NOTE — PROGRESS NOTES
Chief Complaint: Acute cystitis with hematuria and Lesion of bladder    Subjective         History of Present Illness  Zahira Perea is a 70 y.o. female presents to [facility] to be seen for follow-up.    Patient was previously seen by Dr. Bartolo Barrios with last visit on 4/23/2024 for urge incontinence/gross hematuria/recurrent UTI.  At that visit she was continued on self start antibiotics, Gemtesa and advised to follow-up in 1 year.    History of Present Illness  The patient is a 70-year-old female who presents for evaluation of urinary tract infection.    She continues her regimen of Gemtesa, which she reports as being effective.     She has been experiencing recurrent infections over the past year, although she is uncertain if these are true infections or something else.     She had a consultation with Ms. Hernandez, who advised her to discuss her elevated creatinine levels with us, as this could potentially be causing her dysuria.     She has been using Azo for symptom management but currently does not have any on hand. She has been off Azo for the past 3 days and is experiencing some burning during urination today.     She does not have a prescription for the self-start antibiotic she had previously used.     She is on Gemtesa and Azo.    She is also using estrogen vaginal cream once a week and needs a refill.    MEDICATIONS  Gemtesa, estrogen vaginal cream, Azo.    Previous 4/23/2024:  Urge incontinence  Gross hematuria  Recurrent urinary tract infection -on self start antibiotics           Patient is doing better with constipation with her current regimen.  Has had a lot of trouble in the past.     Doing much better since her last appointment.     Has been on Gemtesa 75 mg for the last 2 months, everything is improved.  No incontinence currently        PCP has placed her on medication and has her doing better, bowel movement about every other day now        On self start  antibiotics in the form of Keflex 500 mg p.o. 3 times daily x3 days, has not used yet        Previous     Currently on Keflex 500 mg p.o. 3 times daily x3 days she has taken this twice since prescription 6 months ago.  Does help.     Patient also takes Azo as needed for burning     Drinking Mainly water     Off caffeine.        PVR     2/24   000  5/23   000        Wearing 3 pads daily.  Patient does have stress incontinence also but urgency deftly bothers her the most        DM - controlled           Previous        MiraLAX - did not help     6/22/2023 CBF with bilateral retrogrades -hypervascularity posteriorly, minimal erythema on the right posterior wall and posteriorly.  Biopsies taken.  Bilateral retrogrades normal  Pathology-chronic cystitis     6/3/2023 urine culture-E. Coli -move surgery out,   Keflex 500 mg p.o. 3 times daily x7 days leading up to surgery  3/23 1.2, GFR 49     She has urge incontinence regardless of UTI, no symptoms of burning/dysuria symptoms.  Only when she has infection.     Myrbetriq 50 - did not help     Cranberry tablet did not help        Patient has had some intermittent gross hematuria, Not always with infection.  She has had cystoscopy many years ago for gross hematuria and she has had blood intermittently in her urine for many years.     Bothersome for many years        2/23 CT uro-- possible cystocele.  Diverticulosis.,  Previous hysterectomy.  Images reviewed, delayed phase not complete        Smoker- vape        No history of  malignancy in her family, no history of  surgery, nocturia 2-3      No anticoagulation              Urine culture  2/7/2023 50,000 colony-forming units per mL of E. coli pansensitive         Objective     Past Medical History:   Diagnosis Date    Anxiety 2005    Arthritis 2018    Cervical disc disorder 2020    Depression     Disorder of white blood cells     DENIES ANY CURRENT ISSUES    Essential (primary) hypertension     Gastro-esophageal reflux  disease without esophagitis     Hypercholesterolemia     Hyperlipidemia, unspecified     Low back pain 2020    Lumbar spondylosis     Lumbosacral disc disease 2020    Obesity 2016    Pain management     Stage 3 chronic kidney disease     Urinary tract infection     Vitamin D deficiency        Past Surgical History:   Procedure Laterality Date    COLONOSCOPY  09/2019    CYSTOSCOPY BLADDER BIOPSY Bilateral 6/22/2023    Procedure: CYSTOSCOPY BLADDER BIOPSY AND FULGERATION,BILATERAL RETROGRADE PYLEOGRAM;  Surgeon: Bartolo Barrios MD;  Location: [facility] MAIN OR;  Service: Urology;  Laterality: Bilateral;    EPIDURAL BLOCK  2021    HYSTERECTOMY           Current Outpatient Medications:     atorvastatin (LIPITOR) 20 MG tablet, Take 1 tablet by mouth Every Night., Disp: 90 tablet, Rfl: 1    buPROPion XL (WELLBUTRIN XL) 300 MG 24 hr tablet, TAKE 1 TABLET BY MOUTH EVERY MORNING, Disp: 90 tablet, Rfl: 1    cyanocobalamin 1000 MCG/ML injection, Inject 1 mL under the skin into the appropriate area as directed 1 (One) Time Per Week. Start with 1 mL under the skin into the appropriate area as directed every 7 days x 4 then every 28 days., Disp: 4 mL, Rfl: 3    esomeprazole (nexIUM) 40 MG capsule, Take 1 capsule by mouth Every Morning Before Breakfast., Disp: 90 capsule, Rfl: 1    estradiol (ESTRACE) 0.1 MG/GM vaginal cream, Use a pea sized amount vaginally once weekly at hs, Disp: 42.5 g, Rfl: 11    FLUoxetine (PROzac) 40 MG capsule, Take 1 capsule by mouth Daily., Disp: 90 capsule, Rfl: 0    gabapentin (NEURONTIN) 300 MG capsule, Take 1 capsule by mouth 3 (Three) Times a Day As Needed., Disp: , Rfl:     HYDROcodone-acetaminophen (NORCO) 5-325 MG per tablet, Take 1 tablet by mouth Every 6 (Six) Hours As Needed., Disp: , Rfl:     lisinopril (PRINIVIL,ZESTRIL) 10 MG tablet, TAKE 1 TABLET BY MOUTH DAILY, Disp: 90 tablet, Rfl: 0    Tirzepatide-Weight Management (ZEPBOUND) 15 MG/0.5ML solution auto-injector, Inject 0.5 mL under the  "skin into the appropriate area as directed 1 (One) Time Per Week., Disp: , Rfl:     topiramate (TOPAMAX) 25 MG tablet, Take 1 tablet by mouth Every Night., Disp: 90 tablet, Rfl: 1    traZODone (DESYREL) 50 MG tablet, Take 1 tablet by mouth every night at bedtime., Disp: 90 tablet, Rfl: 1    Vibegron 75 MG tablet, Take 1 tablet by mouth Daily., Disp: 30 tablet, Rfl: 11    Current Facility-Administered Medications:     cyanocobalamin injection 1,000 mcg, 1,000 mcg, Intramuscular, Q28 Days, Paulette Hernandez, APRN, 1,000 mcg at 03/14/25 1505    Allergies   Allergen Reactions    Promethazine Other (See Comments)     Family history of hallucinations when using it. PT REPORTS HAS NEVER HAD REACTION TO IT BUT SEVERAL FAMILY MEMBERS HAVE         Family History   Problem Relation Age of Onset    Arthritis Mother     Stroke Mother     Heart disease Mother     Cancer Father     Hyperlipidemia Father     Vision loss Father     Diabetes Father     Diabetes Sister     Thyroid disease Sister     Heart disease Brother         HEART CONDITION        Social History     Socioeconomic History    Marital status:    Tobacco Use    Smoking status: Former     Current packs/day: 0.00     Average packs/day: 0.5 packs/day for 40.0 years (20.0 ttl pk-yrs)     Types: Cigarettes, Electronic Cigarette     Start date: 1/1/1970     Quit date: 2010     Years since quitting: 15.3     Passive exposure: Past    Smokeless tobacco: Never   Vaping Use    Vaping status: Every Day    Substances: Nicotine, LAST USE THIS AM    Devices: Refillable tank   Substance and Sexual Activity    Alcohol use: Not Currently    Drug use: Never    Sexual activity: Yes     Partners: Male     Birth control/protection: Tubal ligation, Hysterectomy       Vital Signs:   Resp 14   Ht 170.2 cm (67\")   Wt 92.8 kg (204 lb 9.4 oz)   BMI 32.04 kg/m²      Physical Exam  Vitals reviewed.   Constitutional:       Appearance: Normal appearance.   Neurological:      General: No " focal deficit present.      Mental Status: She is alert and oriented to person, place, and time.   Psychiatric:         Mood and Affect: Mood normal.         Behavior: Behavior normal.          Result Review :   The following data was reviewed by: SILVANA Gong on 04/24/2025:      Bladder Scan interpretation 04/24/2025    Estimation of residual urine via BVI 3000 Verathon Bladder Scan  MA/nurse performing: Tomasa MESSER,  Residual Urine: 0 ml  Indication: Mixed stress and urge urinary incontinence    Lesion of bladder    Genitourinary syndrome of menopause    Chronic kidney disease, unspecified CKD stage    Dysuria   Position: Supine  Examination: Incremental scanning of the suprapubic area using 2.0 MHz transducer using copious amounts of acoustic gel.   Findings: An anechoic area was demonstrated which represented the bladder, with measurement of residual urine as noted. I inspected this myself. In that the residual urine was stable or insignificant, refer to plan for treatment and plan necessary at this time.            Results        Procedures        Assessment and Plan    Diagnoses and all orders for this visit:    1. Mixed stress and urge urinary incontinence (Primary)  -     Bladder Scan  -     Vibegron 75 MG tablet; Take 1 tablet by mouth Daily.  Dispense: 30 tablet; Refill: 11    2. Lesion of bladder  -     Bladder Scan    3. Genitourinary syndrome of menopause  -     estradiol (ESTRACE) 0.1 MG/GM vaginal cream; Use a pea sized amount vaginally once weekly at hs  Dispense: 42.5 g; Refill: 11    4. Chronic kidney disease, unspecified CKD stage  -     Ambulatory Referral to Nephrology    5. Dysuria  -     Urine Culture - Urine, Urine, Clean Catch; Future  -     Urine Culture - Urine, Urine, Clean Catch        Assessment & Plan  1. Urinary Tract Infection.  Her creatinine levels have shown a increase. Her GFR has been consistently low for several years, remaining in the 40s.  I will put in a  referral for nephrology for further evaluation    The possibility of interstitial cystitis or painful bladder syndrome was discussed, which could be causing her dysuria even in the absence of bacterial infection. This condition is often triggered by certain foods and drinks that induce inflammation in the bladder.     . She is advised to increase the frequency of her estrogen vaginal cream application to twice weekly to help alleviate the burning sensation.     A urine culture will be conducted today to identify any potential bacterial growth.     She is encouraged to contact us immediately upon experiencing any symptoms over the next few months so that a culture can be performed.     The use of Azo is permitted during episodes of burning.     She is also advised to consider taking a probiotic specifically designed for bladder and vaginal health.     A copy of the IC diet will be provided, and she is encouraged to maintain a food and bladder diary to identify any correlations between her diet and symptoms.    2. Medication Management.   Gemtesa will be refilled through [facility]. She will increase using the estrogen vaginal twice a week. A refill for the estrogen vaginal cream will also be sent to [facility].    Follow-up  The patient will follow up in 6 months.      Follow Up   Return in about 6 months (around 10/24/2025).  Patient was given instructions and counseling regarding her condition or for health maintenance advice. Please see specific information pulled into the AVS if appropriate.         This document has been electronically signed by SILVANA Gong  April 24, 2025 14:18 EDT     Patient or patient representative verbalized consent for the use of Ambient Listening during the visit with  SILVANA Gong for chart documentation. 4/24/2025  14:18 EDT

## 2025-04-24 ENCOUNTER — OFFICE VISIT (OUTPATIENT)
Dept: UROLOGY | Age: 70
End: 2025-04-24
Payer: MEDICARE

## 2025-04-24 VITALS — RESPIRATION RATE: 14 BRPM | HEIGHT: 67 IN | BODY MASS INDEX: 32.11 KG/M2 | WEIGHT: 204.59 LBS

## 2025-04-24 DIAGNOSIS — N39.46 MIXED STRESS AND URGE URINARY INCONTINENCE: Primary | ICD-10-CM

## 2025-04-24 DIAGNOSIS — R30.0 DYSURIA: ICD-10-CM

## 2025-04-24 DIAGNOSIS — N95.8 GENITOURINARY SYNDROME OF MENOPAUSE: ICD-10-CM

## 2025-04-24 DIAGNOSIS — N32.9 LESION OF BLADDER: ICD-10-CM

## 2025-04-24 DIAGNOSIS — N18.9 CHRONIC KIDNEY DISEASE, UNSPECIFIED CKD STAGE: ICD-10-CM

## 2025-04-24 LAB — URINE VOLUME: 0

## 2025-04-24 PROCEDURE — 87186 SC STD MICRODIL/AGAR DIL: CPT | Performed by: NURSE PRACTITIONER

## 2025-04-24 PROCEDURE — 87086 URINE CULTURE/COLONY COUNT: CPT | Performed by: NURSE PRACTITIONER

## 2025-04-24 PROCEDURE — 87077 CULTURE AEROBIC IDENTIFY: CPT | Performed by: NURSE PRACTITIONER

## 2025-04-24 RX ORDER — ESTRADIOL 0.1 MG/G
CREAM VAGINAL
Qty: 42.5 G | Refills: 11 | Status: SHIPPED | OUTPATIENT
Start: 2025-04-24

## 2025-04-26 LAB — BACTERIA SPEC AEROBE CULT: ABNORMAL

## 2025-04-28 DIAGNOSIS — N39.0 ACUTE UTI: Primary | ICD-10-CM

## 2025-04-28 RX ORDER — NITROFURANTOIN 25; 75 MG/1; MG/1
100 CAPSULE ORAL 2 TIMES DAILY
Qty: 14 CAPSULE | Refills: 0 | Status: SHIPPED | OUTPATIENT
Start: 2025-04-28

## 2025-05-23 RX ORDER — ESOMEPRAZOLE MAGNESIUM 40 MG/1
40 CAPSULE, DELAYED RELEASE ORAL
Qty: 90 CAPSULE | Refills: 1 | Status: SHIPPED | OUTPATIENT
Start: 2025-05-23

## 2025-05-27 ENCOUNTER — TELEPHONE (OUTPATIENT)
Dept: UROLOGY | Age: 70
End: 2025-05-27
Payer: MEDICARE

## 2025-05-27 DIAGNOSIS — R30.0 DYSURIA: Primary | ICD-10-CM

## 2025-05-27 NOTE — TELEPHONE ENCOUNTER
PATIENT IS HAVING PAINFUL URINATION AND BURNING. SHE STATES THIS STARTED FRIDAY. SHE STARTED TAKING AZO AND STOPPED IT ON MONDAY. # [phone].

## 2025-05-28 ENCOUNTER — LAB (OUTPATIENT)
Dept: LAB | Facility: HOSPITAL | Age: 70
End: 2025-05-28
Payer: MEDICARE

## 2025-05-28 DIAGNOSIS — R30.0 DYSURIA: ICD-10-CM

## 2025-05-28 PROCEDURE — 87086 URINE CULTURE/COLONY COUNT: CPT

## 2025-05-28 PROCEDURE — 87186 SC STD MICRODIL/AGAR DIL: CPT

## 2025-05-28 PROCEDURE — 87077 CULTURE AEROBIC IDENTIFY: CPT

## 2025-05-31 LAB — BACTERIA SPEC AEROBE CULT: ABNORMAL

## 2025-06-02 DIAGNOSIS — I10 ESSENTIAL HYPERTENSION: Chronic | ICD-10-CM

## 2025-06-02 DIAGNOSIS — N39.0 ACUTE UTI: Primary | ICD-10-CM

## 2025-06-02 RX ORDER — SULFAMETHOXAZOLE AND TRIMETHOPRIM 800; 160 MG/1; MG/1
1 TABLET ORAL 2 TIMES DAILY
Qty: 14 TABLET | Refills: 0 | Status: SHIPPED | OUTPATIENT
Start: 2025-06-02 | End: 2025-06-09

## 2025-06-02 RX ORDER — LISINOPRIL 10 MG/1
10 TABLET ORAL DAILY
Qty: 90 TABLET | Refills: 0 | Status: SHIPPED | OUTPATIENT
Start: 2025-06-02

## 2025-06-13 RX ORDER — ESOMEPRAZOLE MAGNESIUM 40 MG/1
40 CAPSULE, DELAYED RELEASE ORAL
Qty: 90 CAPSULE | Refills: 1 | Status: SHIPPED | OUTPATIENT
Start: 2025-06-13

## 2025-07-03 ENCOUNTER — TELEPHONE (OUTPATIENT)
Dept: INTERNAL MEDICINE | Age: 70
End: 2025-07-03
Payer: MEDICARE

## 2025-07-03 RX ORDER — BUPROPION HYDROCHLORIDE 300 MG/1
300 TABLET ORAL EVERY MORNING
Qty: 90 TABLET | Refills: 1 | Status: SHIPPED | OUTPATIENT
Start: 2025-07-03

## 2025-07-07 RX ORDER — TRAZODONE HYDROCHLORIDE 50 MG/1
50 TABLET ORAL
Qty: 90 TABLET | Refills: 1 | Status: SHIPPED | OUTPATIENT
Start: 2025-07-07

## 2025-07-14 DIAGNOSIS — E53.8 VITAMIN B 12 DEFICIENCY: Primary | ICD-10-CM

## 2025-07-15 DIAGNOSIS — F33.0 MILD RECURRENT MAJOR DEPRESSION: ICD-10-CM

## 2025-07-15 RX ORDER — FLUOXETINE HYDROCHLORIDE 40 MG/1
40 CAPSULE ORAL DAILY
Qty: 90 CAPSULE | Refills: 0 | Status: SHIPPED | OUTPATIENT
Start: 2025-07-15

## 2025-07-17 DIAGNOSIS — N39.0 ACUTE UTI: ICD-10-CM

## 2025-07-17 RX ORDER — NITROFURANTOIN 25; 75 MG/1; MG/1
100 CAPSULE ORAL 2 TIMES DAILY
Qty: 14 CAPSULE | Refills: 0 | OUTPATIENT
Start: 2025-07-17

## 2025-07-17 NOTE — TELEPHONE ENCOUNTER
Antibiotic refill declined.  Please check with the patient to see if there is symptomatic and need a urine culture.  This is actually Jackelyn's patient, I just sent in antibiotics for the patient when Jackelyn was out one day. Thanks.    no known mental health issues.

## 2025-07-18 DIAGNOSIS — R30.0 DYSURIA: Primary | ICD-10-CM

## 2025-07-24 ENCOUNTER — LAB (OUTPATIENT)
Dept: LAB | Facility: HOSPITAL | Age: 70
End: 2025-07-24
Payer: MEDICARE

## 2025-07-24 DIAGNOSIS — R30.0 DYSURIA: ICD-10-CM

## 2025-07-24 PROCEDURE — 87186 SC STD MICRODIL/AGAR DIL: CPT

## 2025-07-24 PROCEDURE — 87077 CULTURE AEROBIC IDENTIFY: CPT

## 2025-07-24 PROCEDURE — 87086 URINE CULTURE/COLONY COUNT: CPT

## 2025-07-26 ENCOUNTER — RESULTS FOLLOW-UP (OUTPATIENT)
Dept: UROLOGY | Age: 70
End: 2025-07-26
Payer: MEDICARE

## 2025-07-26 DIAGNOSIS — N39.0 ACUTE UTI: Primary | ICD-10-CM

## 2025-07-26 LAB — BACTERIA SPEC AEROBE CULT: ABNORMAL

## 2025-07-26 RX ORDER — SULFAMETHOXAZOLE AND TRIMETHOPRIM 800; 160 MG/1; MG/1
1 TABLET ORAL 2 TIMES DAILY
Qty: 14 TABLET | Refills: 0 | Status: SHIPPED | OUTPATIENT
Start: 2025-07-26 | End: 2025-08-02

## 2025-07-28 NOTE — PROGRESS NOTES
Looks like Kaylene sent in an antibiotic on Saturday- can you check and see if she let the patient know?

## 2025-07-28 NOTE — TELEPHONE ENCOUNTER
Called pt to make sure she knew [facility] sent in ABX on Saturday and she didn't check her messages but I let her know that the ABX was sent to her pharmacy and to , pt verbalized understanding and thanked me for calling her.

## 2025-08-26 ENCOUNTER — LAB (OUTPATIENT)
Dept: LAB | Facility: HOSPITAL | Age: 70
End: 2025-08-26
Payer: MEDICARE

## 2025-08-26 ENCOUNTER — TRANSCRIBE ORDERS (OUTPATIENT)
Dept: ADMINISTRATIVE | Facility: HOSPITAL | Age: 70
End: 2025-08-26
Payer: MEDICARE

## 2025-08-26 DIAGNOSIS — E78.5 HYPERLIPIDEMIA, UNSPECIFIED HYPERLIPIDEMIA TYPE: Chronic | ICD-10-CM

## 2025-08-26 DIAGNOSIS — I10 ESSENTIAL HYPERTENSION: ICD-10-CM

## 2025-08-26 DIAGNOSIS — R73.03 PREDIABETES: ICD-10-CM

## 2025-08-26 DIAGNOSIS — N18.30 STAGE 3 CHRONIC KIDNEY DISEASE, UNSPECIFIED WHETHER STAGE 3A OR 3B CKD: ICD-10-CM

## 2025-08-26 DIAGNOSIS — E55.9 VITAMIN D DEFICIENCY: ICD-10-CM

## 2025-08-26 DIAGNOSIS — E53.8 VITAMIN B 12 DEFICIENCY: ICD-10-CM

## 2025-08-26 DIAGNOSIS — N18.30 STAGE 3 CHRONIC KIDNEY DISEASE, UNSPECIFIED WHETHER STAGE 3A OR 3B CKD: Primary | ICD-10-CM

## 2025-08-26 LAB
25(OH)D3 SERPL-MCNC: 30.9 NG/ML (ref 30–100)
BACTERIA UR QL AUTO: NORMAL /HPF
BILIRUB UR QL STRIP: NEGATIVE
CLARITY UR: CLEAR
COLOR UR: YELLOW
CREAT UR-MCNC: 120.9 MG/DL
FOLATE SERPL-MCNC: 7.89 NG/ML (ref 4.78–24.2)
GLUCOSE UR STRIP-MCNC: NEGATIVE MG/DL
HBA1C MFR BLD: 5.4 % (ref 4.8–5.6)
HGB UR QL STRIP.AUTO: NEGATIVE
HYALINE CASTS UR QL AUTO: NORMAL /LPF
KETONES UR QL STRIP: NEGATIVE
LEUKOCYTE ESTERASE UR QL STRIP.AUTO: ABNORMAL
NITRITE UR QL STRIP: NEGATIVE
PH UR STRIP.AUTO: 6 [PH] (ref 5–8)
PHOSPHATE SERPL-MCNC: 3.6 MG/DL (ref 2.5–4.5)
PROT ?TM UR-MCNC: 8.9 MG/DL
PROT UR QL STRIP: NEGATIVE
PROT/CREAT UR: 0.07 MG/G{CREAT}
RBC # UR STRIP: NORMAL /HPF
REF LAB TEST METHOD: NORMAL
SP GR UR STRIP: 1.02 (ref 1–1.03)
SQUAMOUS #/AREA URNS HPF: NORMAL /HPF
UROBILINOGEN UR QL STRIP: ABNORMAL
VIT B12 BLD-MCNC: 1837 PG/ML (ref 211–946)
WBC # UR STRIP: NORMAL /HPF

## 2025-08-26 PROCEDURE — 81001 URINALYSIS AUTO W/SCOPE: CPT

## 2025-08-26 PROCEDURE — 82746 ASSAY OF FOLIC ACID SERUM: CPT

## 2025-08-26 PROCEDURE — 82306 VITAMIN D 25 HYDROXY: CPT

## 2025-08-26 PROCEDURE — 85025 COMPLETE CBC W/AUTO DIFF WBC: CPT

## 2025-08-26 PROCEDURE — 82570 ASSAY OF URINE CREATININE: CPT

## 2025-08-26 PROCEDURE — 84156 ASSAY OF PROTEIN URINE: CPT

## 2025-08-26 PROCEDURE — 85007 BL SMEAR W/DIFF WBC COUNT: CPT

## 2025-08-26 PROCEDURE — 84443 ASSAY THYROID STIM HORMONE: CPT

## 2025-08-26 PROCEDURE — 84100 ASSAY OF PHOSPHORUS: CPT

## 2025-08-26 PROCEDURE — 83735 ASSAY OF MAGNESIUM: CPT

## 2025-08-26 PROCEDURE — 80061 LIPID PANEL: CPT

## 2025-08-26 PROCEDURE — 80053 COMPREHEN METABOLIC PANEL: CPT

## 2025-08-26 PROCEDURE — 83036 HEMOGLOBIN GLYCOSYLATED A1C: CPT

## 2025-08-26 PROCEDURE — 36415 COLL VENOUS BLD VENIPUNCTURE: CPT

## 2025-08-26 PROCEDURE — 82607 VITAMIN B-12: CPT

## 2025-08-27 LAB
ALBUMIN SERPL-MCNC: 4.3 G/DL (ref 3.5–5.2)
ALBUMIN/GLOB SERPL: 1.5 G/DL
ALP SERPL-CCNC: 86 U/L (ref 39–117)
ALT SERPL W P-5'-P-CCNC: 12 U/L (ref 1–33)
ANION GAP SERPL CALCULATED.3IONS-SCNC: 10 MMOL/L (ref 5–15)
AST SERPL-CCNC: 9 U/L (ref 1–32)
BASOPHILS # BLD MANUAL: 0.08 10*3/MM3 (ref 0–0.2)
BASOPHILS NFR BLD MANUAL: 1 % (ref 0–1.5)
BILIRUB SERPL-MCNC: 0.3 MG/DL (ref 0–1.2)
BUN SERPL-MCNC: 20 MG/DL (ref 8–23)
BUN/CREAT SERPL: 18 (ref 7–25)
CALCIUM SPEC-SCNC: 10 MG/DL (ref 8.6–10.5)
CHLORIDE SERPL-SCNC: 105 MMOL/L (ref 98–107)
CHOLEST SERPL-MCNC: 195 MG/DL (ref 0–200)
CO2 SERPL-SCNC: 21 MMOL/L (ref 22–29)
CREAT SERPL-MCNC: 1.11 MG/DL (ref 0.57–1)
DEPRECATED RDW RBC AUTO: 47.5 FL (ref 37–54)
EGFRCR SERPLBLD CKD-EPI 2021: 53.3 ML/MIN/1.73
EOSINOPHIL # BLD MANUAL: 0 10*3/MM3 (ref 0–0.4)
EOSINOPHIL NFR BLD MANUAL: 0 % (ref 0.3–6.2)
ERYTHROCYTE [DISTWIDTH] IN BLOOD BY AUTOMATED COUNT: 13.8 % (ref 12.3–15.4)
GLOBULIN UR ELPH-MCNC: 2.8 GM/DL
GLUCOSE SERPL-MCNC: 89 MG/DL (ref 65–99)
HCT VFR BLD AUTO: 39.2 % (ref 34–46.6)
HDLC SERPL-MCNC: 42 MG/DL (ref 40–60)
HGB BLD-MCNC: 12.9 G/DL (ref 12–15.9)
LDLC SERPL CALC-MCNC: 135 MG/DL (ref 0–100)
LDLC/HDLC SERPL: 3.17 {RATIO}
LYMPHOCYTES # BLD MANUAL: 4.51 10*3/MM3 (ref 0.7–3.1)
LYMPHOCYTES NFR BLD MANUAL: 4 % (ref 5–12)
MAGNESIUM SERPL-MCNC: 2.2 MG/DL (ref 1.6–2.4)
MCH RBC QN AUTO: 31 PG (ref 26.6–33)
MCHC RBC AUTO-ENTMCNC: 32.9 G/DL (ref 31.5–35.7)
MCV RBC AUTO: 94.2 FL (ref 79–97)
MONOCYTES # BLD: 0.32 10*3/MM3 (ref 0.1–0.9)
NEUTROPHILS # BLD AUTO: 3.06 10*3/MM3 (ref 1.7–7)
NEUTROPHILS NFR BLD MANUAL: 38.4 % (ref 42.7–76)
NRBC BLD AUTO-RTO: 0 /100 WBC (ref 0–0.2)
PLAT MORPH BLD: NORMAL
PLATELET # BLD AUTO: 332 10*3/MM3 (ref 140–450)
PMV BLD AUTO: 10.2 FL (ref 6–12)
POIKILOCYTOSIS BLD QL SMEAR: ABNORMAL
POTASSIUM SERPL-SCNC: 4.2 MMOL/L (ref 3.5–5.2)
PROT SERPL-MCNC: 7.1 G/DL (ref 6–8.5)
RBC # BLD AUTO: 4.16 10*6/MM3 (ref 3.77–5.28)
SODIUM SERPL-SCNC: 136 MMOL/L (ref 136–145)
TRIGL SERPL-MCNC: 100 MG/DL (ref 0–150)
TSH SERPL DL<=0.05 MIU/L-ACNC: 0.98 UIU/ML (ref 0.27–4.2)
VARIANT LYMPHS NFR BLD MANUAL: 56.6 % (ref 19.6–45.3)
VLDLC SERPL-MCNC: 18 MG/DL (ref 5–40)
WBC MORPH BLD: NORMAL
WBC NRBC COR # BLD AUTO: 7.96 10*3/MM3 (ref 3.4–10.8)

## 2025-08-29 DIAGNOSIS — R30.0 DYSURIA: Primary | ICD-10-CM
